# Patient Record
Sex: FEMALE | Race: WHITE | NOT HISPANIC OR LATINO | Employment: OTHER | URBAN - METROPOLITAN AREA
[De-identification: names, ages, dates, MRNs, and addresses within clinical notes are randomized per-mention and may not be internally consistent; named-entity substitution may affect disease eponyms.]

---

## 2017-01-26 ENCOUNTER — TELEPHONE (OUTPATIENT)
Dept: OBSTETRICS AND GYNECOLOGY | Facility: CLINIC | Age: 27
End: 2017-01-26

## 2017-01-26 DIAGNOSIS — N93.0 BLEEDING AFTER INTERCOURSE: Primary | ICD-10-CM

## 2017-01-26 NOTE — TELEPHONE ENCOUNTER
Pt states she just recently started having bleeding after intercourse.  Scheduled US and appt with Dr. Bennett 2/6 @ 1100.  US prep given.

## 2017-02-06 ENCOUNTER — OFFICE VISIT (OUTPATIENT)
Dept: OBSTETRICS AND GYNECOLOGY | Facility: CLINIC | Age: 27
End: 2017-02-06
Payer: COMMERCIAL

## 2017-02-06 ENCOUNTER — PATIENT MESSAGE (OUTPATIENT)
Dept: OBSTETRICS AND GYNECOLOGY | Facility: CLINIC | Age: 27
End: 2017-02-06

## 2017-02-06 VITALS
HEIGHT: 68 IN | WEIGHT: 133.19 LBS | SYSTOLIC BLOOD PRESSURE: 104 MMHG | DIASTOLIC BLOOD PRESSURE: 64 MMHG | BODY MASS INDEX: 20.18 KG/M2

## 2017-02-06 DIAGNOSIS — N93.0 PCB (POST COITAL BLEEDING): Primary | ICD-10-CM

## 2017-02-06 DIAGNOSIS — N76.5 VAGINAL ULCERATION: ICD-10-CM

## 2017-02-06 DIAGNOSIS — N94.10 DYSPAREUNIA IN FEMALE: ICD-10-CM

## 2017-02-06 PROCEDURE — 99999 PR PBB SHADOW E&M-EST. PATIENT-LVL III: CPT | Mod: PBBFAC,,, | Performed by: OBSTETRICS & GYNECOLOGY

## 2017-02-06 PROCEDURE — 87529 HSV DNA AMP PROBE: CPT

## 2017-02-06 PROCEDURE — 99213 OFFICE O/P EST LOW 20 MIN: CPT | Mod: S$GLB,,, | Performed by: OBSTETRICS & GYNECOLOGY

## 2017-02-06 PROCEDURE — 87480 CANDIDA DNA DIR PROBE: CPT

## 2017-02-06 RX ORDER — NORETHINDRONE ACETATE AND ETHINYL ESTRADIOL .02; 1 MG/1; MG/1
1 TABLET ORAL DAILY
Qty: 30 TABLET | Refills: 11 | Status: SHIPPED | OUTPATIENT
Start: 2017-02-06 | End: 2017-05-24

## 2017-02-06 NOTE — PROGRESS NOTES
"Dyspareunia and post-coital spotting. Ulcer noted in vagina adjacent to urethra. HSV swab obtained. Sitz baths recommended.     Chief Complaint:      HPI:      Ainsley Mendez is a 26 y.o.  who presents complaining of continued dyspareunia. States that she has been seeing her boyfriend since 2016. In 2016 she began having continuous vaginal bleeding despite being on Seasonique at the time. Bled x 3 weeks. Tried a higher estrogen dose OCP and then stopped pills all together. Approximately 2 months later was placed back on Seasonique with no further bleeding issues. Has been having a regular monthly period since that time, but feels that she has decreased vaginal lubrication. Was treated for BV in 10/2016 and 2016. States that since 2016 she has noticed pain with intercourse that she feels occurs "just inside of the opening toward the front". Occurs every time and with any position. She reports that over the past few weeks she has begun having minimal post coital spotting after every episode of intercourse. Only notices a few spots, does not persist longer than that.  Ms. Mendez is currently sexually active with a single partner(s). Patient has regular monthly menses. Patient's last menstrual period was 2017 (exact date).      ROS:     GENERAL: Denies fevers or chills. Feeling well overall.   ABDOMEN: Denies abdominal pain, constipation, diarrhea, nausea, vomiting, change in appetite.   URINARY: Denies frequency, dysuria, hematuria.  GYNECOLOGIC: See HPI.  NEUROLOGIC: Denies syncope or weakness.     Physical Exam:      PHYSICAL EXAM:  Visit Vitals    /64    Ht 5' 8" (1.727 m)    Wt 60.4 kg (133 lb 2.5 oz)    LMP 2017 (Exact Date)    BMI 20.25 kg/m2     Body mass index is 20.25 kg/(m^2).     APPEARANCE: Well nourished, well developed, in no acute distress.  ABDOMEN: Soft.  No tenderness or masses.    PELVIC: Normal external genitalia without lesions.  Normal hair " distribution.  Adequate perineal body, normal urethral meatus.  Vagina moist and well rugated without lesions or discharge.  Cervix pink, without lesions, discharge or tenderness.  No significant cystocele or rectocele.  Bimanual exam shows uterus to be normal size, regular, mobile and nontender.  Adnexa without masses or tenderness.  4 mm ulceration just adjacent to the uretha (between the urethra and lateral vagina) on the right. No drainage. Appears like it began as a tear.   EXTREMITIES: No edema.     Results:      Pelvic U/S performed in clinic today: No abnormalities noted.    Assessment/Plan:     PCB (post coital bleeding)  -     Vaginosis Screen by DNA Probe  -     Mycoplasma genitalium Molecular Detection, PCR Cervix; Future; Expected date: 2/6/17  -     Mycoplasma / ureaplasma culture Cervix; Future; Expected date: 2/6/17    Dyspareunia in female  -     Vaginosis Screen by DNA Probe  -     Mycoplasma genitalium Molecular Detection, PCR Cervix; Future; Expected date: 2/6/17  -     Mycoplasma / ureaplasma culture Cervix; Future; Expected date: 2/6/17    Vaginal ulceration  -     Herpes simplex Virus (HSV) Type 1 & 2 DNA by PCR; Future; Expected date: 2/6/17  -     Herpes Simplex Virus 1&2 PCR Non-Blood Genital (vagina)        -     Recommended 10 minute sitz baths with hydrogen peroxide bid x 1 week        -     Recommended abstinence for the next week         -     Lower dose estrogen OCP sent in for patient to increase lubrication per patient request, OTC lubricant recommended

## 2017-02-06 NOTE — MR AVS SNAPSHOT
[facility]  [address]  [place] [address]  Phone: [phone]  Fax: [phone]                  Ainsley Mendez   2017 11:30 AM   Office Visit    Description:  Female : 1990   Provider:  Valarie Bennett MD   Department:  [facility]           Reason for Visit     Follow-up           Diagnoses this Visit        Comments    PCB (post coital bleeding)    -  Primary     Dyspareunia in female         Vaginal ulceration                To Do List           Goals (5 Years of Data)     None      Follow-Up and Disposition     Return if symptoms worsen or fail to improve.    Follow-up and Disposition History       These Medications        Disp Refills Start End    norethindrone-ethinyl estradiol (MICROGESTIN 1/20) 1-20 mg-mcg per tablet 30 tablet 11 2017    Take 1 tablet by mouth once daily. - Oral    Pharmacy: [facility]/pharmacy #[id number] - [place] - [address] [place] [address] #: [phone]         [facility] On Call     [facility] On Call Nurse Care Line -  Assistance  Registered nurses in the [facility] On Call Center provide clinical advisement, health education, appointment booking, and other advisory services.  Call for this free service at [phone].             Medications           Message regarding Medications     Verify the changes and/or additions to your medication regime listed below are the same as discussed with your clinician today.  If any of these changes or additions are incorrect, please notify your healthcare provider.        START taking these NEW medications        Refills    norethindrone-ethinyl estradiol (MICROGESTIN 1/20) 1-20 mg-mcg per tablet 11    Sig: Take 1 tablet by mouth once daily.    Class: Normal    Route: Oral      STOP taking these medications     L norgest/e.estradiol-e.estrad (SEASONIQUE) 0.15 mg-30 mcg (84)/10 mcg (7) 3MPk Take 1 tablet by mouth once daily.           Verify that the below list of  "medications is an accurate representation of the medications you are currently taking.  If none reported, the list may be blank. If incorrect, please contact your healthcare provider. Carry this list with you in case of emergency.           Current Medications     loratadine-pseudoephedrine 5-120 mg (CLARITIN-D 12-HOUR) 5-120 mg per tablet Take 1 tablet by mouth 2 (two) times daily.    norethindrone-ethinyl estradiol (MICROGESTIN 1/20) 1-20 mg-mcg per tablet Take 1 tablet by mouth once daily.    ondansetron (ZOFRAN, AS HYDROCHLORIDE,) 4 MG tablet Take 1 tablet (4 mg total) by mouth daily as needed for Nausea.           Clinical Reference Information           Your Vitals Were     BP Height Weight Last Period BMI    104/64 5' 8" (1.727 m) 60.4 kg (133 lb 2.5 oz) 01/31/2017 (Exact Date) 20.25 kg/m2      Blood Pressure          Most Recent Value    BP  104/64      Allergies as of 2/6/2017     Neomycin      Immunizations Administered on Date of Encounter - 2/6/2017     None      Orders Placed During Today's Visit      Normal Orders This Visit    Herpes Simplex Virus 1&2 PCR Non-Blood Genital (vagina)     Mycoplasma / ureaplasma culture Cervix     Mycoplasma genitalium Molecular Detection, PCR Cervix     Vaginosis Screen by DNA Probe     Future Labs/Procedures Expected by Expires    Herpes simplex Virus (HSV) Type 1 & 2 DNA by PCR  2/6/2017 4/7/2018    Mycoplasma / ureaplasma culture Cervix  2/6/2017 4/7/2018    Mycoplasma genitalium Molecular Detection, PCR Cervix  2/6/2017 4/7/2018      Language Assistance Services     ATTENTION: Language assistance services are available, free of charge. Please call [phone].      ATENCIÓN: Si habla dixon, tiene a ernandez disposición servicios gratuitos de asistencia lingüística. Llame al [phone].     CHÚ Ý: N?u b?n nói Ti?ng Vi?t, có các d?ch v? h? tr? ngôn ng? mi?n phí dành cho b?n. G?i s? [phone].         [facility]'s [facility] complies with applicable Federal " civil rights laws and does not discriminate on the basis of race, color, national origin, age, disability, or sex.

## 2017-02-07 ENCOUNTER — DOCUMENTATION ONLY (OUTPATIENT)
Dept: OBSTETRICS AND GYNECOLOGY | Facility: CLINIC | Age: 27
End: 2017-02-07

## 2017-02-07 LAB
CANDIDA RRNA VAG QL PROBE: NEGATIVE
G VAGINALIS RRNA GENITAL QL PROBE: NEGATIVE
T VAGINALIS RRNA GENITAL QL PROBE: NEGATIVE

## 2017-02-07 NOTE — PROGRESS NOTES
Contacted by lab to say Mycoplasma specimen could not be sent as ordered because vaginal swab reagent had been added to the specimen.   Lab reportedly received only one specimen and ran the affirm (vaginal swab) test on that specimen.   Two specimens were collected, one for affirm and one for Mycoplasma, and to the best of my knowledge, two specimens were sent to lab.

## 2017-02-08 ENCOUNTER — OFFICE VISIT (OUTPATIENT)
Dept: OBSTETRICS AND GYNECOLOGY | Facility: CLINIC | Age: 27
End: 2017-02-08
Payer: COMMERCIAL

## 2017-02-08 DIAGNOSIS — N76.0 ACUTE VAGINITIS: Primary | ICD-10-CM

## 2017-02-08 DIAGNOSIS — N76.5 VAGINAL ULCERATION: ICD-10-CM

## 2017-02-08 LAB
HSV PCR SPECIMEN SOURCE: NORMAL
HSV1 PCR RESULT: NOT DETECTED
HSV2 PCR RESULT: NOT DETECTED

## 2017-02-08 PROCEDURE — 99499 UNLISTED E&M SERVICE: CPT | Mod: S$GLB,,, | Performed by: OBSTETRICS & GYNECOLOGY

## 2017-02-08 PROCEDURE — 87798 DETECT AGENT NOS DNA AMP: CPT

## 2017-02-14 ENCOUNTER — PATIENT MESSAGE (OUTPATIENT)
Dept: OBSTETRICS AND GYNECOLOGY | Facility: CLINIC | Age: 27
End: 2017-02-14

## 2017-02-14 LAB
SPECIMEN SOURCE: ABNORMAL
U PARVUM DNA SPEC QL NAA+PROBE: POSITIVE
U UREALYTICUM DNA SPEC QL NAA+PROBE: NEGATIVE

## 2017-02-14 RX ORDER — DOXYCYCLINE HYCLATE 100 MG
100 TABLET ORAL 2 TIMES DAILY
Qty: 28 TABLET | Refills: 0 | Status: SHIPPED | OUTPATIENT
Start: 2017-02-14 | End: 2017-02-28

## 2017-03-27 ENCOUNTER — PATIENT MESSAGE (OUTPATIENT)
Dept: OBSTETRICS AND GYNECOLOGY | Facility: CLINIC | Age: 27
End: 2017-03-27

## 2017-03-29 ENCOUNTER — OFFICE VISIT (OUTPATIENT)
Dept: OBSTETRICS AND GYNECOLOGY | Facility: CLINIC | Age: 27
End: 2017-03-29
Payer: COMMERCIAL

## 2017-03-29 VITALS
DIASTOLIC BLOOD PRESSURE: 76 MMHG | WEIGHT: 133.06 LBS | SYSTOLIC BLOOD PRESSURE: 114 MMHG | HEIGHT: 68 IN | BODY MASS INDEX: 20.16 KG/M2

## 2017-03-29 DIAGNOSIS — N76.1 CHRONIC VAGINITIS: Primary | ICD-10-CM

## 2017-03-29 DIAGNOSIS — N93.0 PCB (POST COITAL BLEEDING): ICD-10-CM

## 2017-03-29 DIAGNOSIS — N76.5 VAGINAL ULCERATION: ICD-10-CM

## 2017-03-29 PROCEDURE — 99213 OFFICE O/P EST LOW 20 MIN: CPT | Mod: S$GLB,,, | Performed by: OBSTETRICS & GYNECOLOGY

## 2017-03-29 PROCEDURE — 99999 PR PBB SHADOW E&M-EST. PATIENT-LVL III: CPT | Mod: PBBFAC,,, | Performed by: OBSTETRICS & GYNECOLOGY

## 2017-03-29 PROCEDURE — 87798 DETECT AGENT NOS DNA AMP: CPT | Mod: 91

## 2017-03-29 PROCEDURE — 1160F RVW MEDS BY RX/DR IN RCRD: CPT | Mod: S$GLB,,, | Performed by: OBSTETRICS & GYNECOLOGY

## 2017-03-29 PROCEDURE — 87480 CANDIDA DNA DIR PROBE: CPT

## 2017-03-29 PROCEDURE — 87798 DETECT AGENT NOS DNA AMP: CPT

## 2017-03-29 PROCEDURE — 87529 HSV DNA AMP PROBE: CPT

## 2017-03-29 NOTE — MR AVS SNAPSHOT
"    [facility]  [address]  [place] [address]  Phone: [phone]  Fax: [phone]                  Ainsley Mendez   3/29/2017 3:30 PM   Office Visit    Description:  Female : 1990   Provider:  Valarie Bennett MD   Department:  [facility]           Reason for Visit     Follow-up                To Do List           Future Appointments        Provider Department Dept Phone    2017 8:30 AM Valarie Bennett MD [facility] [phone]      Goals (5 Years of Data)     None      [facility] On Call     [facility] On Call Nurse [facility] Line -  Assistance  Registered nurses in the [facility] On Call Center provide clinical advisement, health education, appointment booking, and other advisory services.  Call for this free service at [phone].             Medications           Message regarding Medications     Verify the changes and/or additions to your medication regime listed below are the same as discussed with your clinician today.  If any of these changes or additions are incorrect, please notify your healthcare provider.             Verify that the below list of medications is an accurate representation of the medications you are currently taking.  If none reported, the list may be blank. If incorrect, please contact your healthcare provider. Carry this list with you in case of emergency.           Current Medications     loratadine-pseudoephedrine 5-120 mg (CLARITIN-D 12-HOUR) 5-120 mg per tablet Take 1 tablet by mouth 2 (two) times daily.    norethindrone-ethinyl estradiol (MICROGESTIN ) 1-20 mg-mcg per tablet Take 1 tablet by mouth once daily.    ondansetron (ZOFRAN, AS HYDROCHLORIDE,) 4 MG tablet Take 1 tablet (4 mg total) by mouth daily as needed for Nausea.           Clinical Reference Information           Your Vitals Were     BP Height Weight Last Period BMI    114/76 5' 8" (1.727 m) 60.3 kg (133 lb 0.8 oz) " 03/03/2017 (Approximate) 20.23 kg/m2      Blood Pressure          Most Recent Value    BP  114/76      Allergies as of 3/29/2017     Neomycin      Immunizations Administered on Date of Encounter - 3/29/2017     None      Language Assistance Services     ATTENTION: Language assistance services are available, free of charge. Please call [phone].      ATENCIÓN: Si habla bryanañol, tiene a ernandez disposición servicios gratuitos de asistencia lingüística. Llame al [phone].     CHÚ Ý: N?u b?n nói Ti?ng Vi?t, có các d?ch v? h? tr? ngôn ng? mi?n phí dành cho b?n. G?i s? [phone].         [facility]'s [facility] complies with applicable Federal civil rights laws and does not discriminate on the basis of race, color, national origin, age, disability, or sex.

## 2017-03-31 ENCOUNTER — PATIENT MESSAGE (OUTPATIENT)
Dept: OBSTETRICS AND GYNECOLOGY | Facility: CLINIC | Age: 27
End: 2017-03-31

## 2017-03-31 LAB
HSV PCR SPECIMEN SOURCE: NORMAL
HSV1 PCR RESULT: NOT DETECTED
HSV2 PCR RESULT: NOT DETECTED

## 2017-03-31 NOTE — PROGRESS NOTES
"  Chief Complaint: Post-Coital Bleeding, Dyspareunia     HPI:      Ainsley Mendez is a 26 y.o.  who presents complaining of post-coital spotting and dyspareunia. Patient has recently been treated for a few recurrent bouts of vaginitis, including a 2 week course for both her and her partner for ureaplasma infection. She currently denies discharge or odor, and only has pain during intercourse. Pain occurs almost immediately and lasts throughout intercourse. Afterwards notices spotting on the toilet paper with her first void, but not thereafter.  Ms. Mendez is currently sexually active with a single partner(s). Patient has regular monthly menses. Patient's last menstrual period was 2017 (approximate).      ROS:     GENERAL: Denies unintentional weight gain or weight loss. Feeling well overall.   ABDOMEN: Denies abdominal pain, constipation, diarrhea, nausea, vomiting, change in appetite.   URINARY: Denies frequency, dysuria, hematuria.  GYNECOLOGIC: See HPI.  NEUROLOGIC: Denies syncope or weakness.     Physical Exam:      PHYSICAL EXAM:  /76  Ht 5' 8" (1.727 m)  Wt 60.3 kg (133 lb 0.8 oz)  LMP 2017 (Approximate)  BMI 20.23 kg/m2  Body mass index is 20.23 kg/(m^2).     APPEARANCE: Well nourished, well developed, in no acute distress.  ABDOMEN: Soft.  No tenderness or masses.    PELVIC: Normal external genitalia without lesions.  Normal hair distribution.  Adequate perineal body, normal urethral meatus.  Vagina moist and well rugated without discharge. Areas of ulceration bilaterally adjacent to the urethral meatus which were evident only when the vaginal wall was pulled away. Ulcerations are irregular, with no raised borders. Each about 3 mm in greatest diameter. Both are a darker shade of pink than the surrounding tissue, as if the most superficial layer has been rubbed off. No evidence of infection. Do not appear c/w HSV. Cervix pink, without lesions, discharge or tenderness.  No " significant cystocele or rectocele.  Bimanual exam shows uterus to be normal size, regular, mobile and nontender.  Adnexa without masses or tenderness.  No pain noted on careful exam other than in bilateral areas of ulceration.    EXTREMITIES: No edema.       Assessment/Plan:     Chronic vaginitis  -     Vaginosis Screen by DNA Probe  -     Cancel: Herpes simplex virus culture [facility]; Vagina  -     Mycoplasma genitalium Molecular Detection, PCR Cervix; Future; Expected date: 3/29/17  -     Mycoplasma / ureaplasma culture Cervix; Future; Expected date: 3/29/17    PCB (post coital bleeding)    Vaginal ulceration  -     Herpes Simplex Virus 1&2 PCR Non-Blood        -     Patient counseled that she needs pelvic rest until areas of ulceration have healed.        -     Will RTC in 3 weeks for assessment of healing.

## 2017-04-04 LAB
MYCOPLASMA GENITALIUM RESULT: NEGATIVE
SPECIMEN SOURCE: NORMAL
SPECIMEN SOURCE: NORMAL
U PARVUM DNA SPEC QL NAA+PROBE: NEGATIVE
U UREALYTICUM DNA SPEC QL NAA+PROBE: NEGATIVE

## 2017-04-07 ENCOUNTER — PATIENT MESSAGE (OUTPATIENT)
Dept: OBSTETRICS AND GYNECOLOGY | Facility: CLINIC | Age: 27
End: 2017-04-07

## 2017-04-19 ENCOUNTER — OFFICE VISIT (OUTPATIENT)
Dept: OBSTETRICS AND GYNECOLOGY | Facility: CLINIC | Age: 27
End: 2017-04-19
Payer: COMMERCIAL

## 2017-04-19 VITALS
WEIGHT: 134.94 LBS | SYSTOLIC BLOOD PRESSURE: 106 MMHG | BODY MASS INDEX: 20.45 KG/M2 | DIASTOLIC BLOOD PRESSURE: 70 MMHG | HEIGHT: 68 IN

## 2017-04-19 DIAGNOSIS — N76.5 VAGINAL ULCERATION: Primary | ICD-10-CM

## 2017-04-19 PROCEDURE — 1160F RVW MEDS BY RX/DR IN RCRD: CPT | Mod: S$GLB,,, | Performed by: OBSTETRICS & GYNECOLOGY

## 2017-04-19 PROCEDURE — 99212 OFFICE O/P EST SF 10 MIN: CPT | Mod: S$GLB,,, | Performed by: OBSTETRICS & GYNECOLOGY

## 2017-04-19 PROCEDURE — 99999 PR PBB SHADOW E&M-EST. PATIENT-LVL II: CPT | Mod: PBBFAC,,, | Performed by: OBSTETRICS & GYNECOLOGY

## 2017-04-19 NOTE — MR AVS SNAPSHOT
[facility] [address]  [place] [address]  Phone: [phone]  Fax: [phone]                  Ainsley Mendez   2017 8:30 AM   Office Visit    Description:  Female : 1990   Provider:  Valarie Bennett MD   Department:  [facility]           Reason for Visit     Follow-up           Diagnoses this Visit        Comments    Vaginal ulceration    -  Primary            To Do List           Goals (5 Years of Data)     None      Follow-Up and Disposition     Return if symptoms worsen or fail to improve.    Follow-up and Disposition History      [facility] On Call     [facility] On Call Nurse Care Line -  Assistance  Unless otherwise directed by your provider, please contact [facility], our nurse care line that is available for  assistance.     Registered nurses in the [facility] On Call Center provide: appointment scheduling, clinical advisement, health education, and other advisory services.  Call: [phone] (toll free)               Medications           Message regarding Medications     Verify the changes and/or additions to your medication regime listed below are the same as discussed with your clinician today.  If any of these changes or additions are incorrect, please notify your healthcare provider.             Verify that the below list of medications is an accurate representation of the medications you are currently taking.  If none reported, the list may be blank. If incorrect, please contact your healthcare provider. Carry this list with you in case of emergency.           Current Medications     loratadine-pseudoephedrine 5-120 mg (CLARITIN-D 12-HOUR) 5-120 mg per tablet Take 1 tablet by mouth 2 (two) times daily.    norethindrone-ethinyl estradiol (MICROGESTIN /20) 1-20 mg-mcg per tablet Take 1 tablet by mouth once daily.    ondansetron (ZOFRAN, AS HYDROCHLORIDE,) 4 MG tablet Take 1 tablet (4 mg total) by mouth daily as  "needed for Nausea.           Clinical Reference Information           Your Vitals Were     BP Height Weight Last Period BMI    106/70 5' 8" (1.727 m) 61.2 kg (134 lb 14.7 oz) 04/01/2017 (Approximate) 20.51 kg/m2      Blood Pressure          Most Recent Value    BP  106/70      Allergies as of 4/19/2017     Neomycin      Immunizations Administered on Date of Encounter - 4/19/2017     None      Language Assistance Services     ATTENTION: Language assistance services are available, free of charge. Please call [phone].      ATENCIÓN: Si habla español, tiene a ernandez disposición servicios gratuitos de asistencia lingüística. Llame al [phone].     PAM Ý: N?u b?n nói Ti?ng Vi?t, có các d?ch v? h? tr? ngôn ng? mi?n phí dành cho b?n. G?i s? [phone].         [facility] complies with applicable Federal civil rights laws and does not discriminate on the basis of race, color, national origin, age, disability, or sex.        "

## 2017-04-19 NOTE — PROGRESS NOTES
"  Chief Complaint: f/u vaginal ulceration     HPI:      Ainsley Mendez is a 27 y.o.  who presents for follow up of vaginal ulcerations. Patient has a several month h/o recurrent vaginitis. At last visit was noted to have ulcerations adjacent to the urethral meatus bilaterally. For the past 2 weeks she has been taking Sitz baths and undergoing pelvic rest. Denies abnormal discharge or vaginal/pelvic pain at this time. Patient's last menstrual period was 2017 (approximate).      ROS:     GENERAL: Denies fevers, chills. Feeling well overall.   ABDOMEN: Denies abdominal pain, constipation, diarrhea, nausea, vomiting, change in appetite.   URINARY: Denies frequency, dysuria, hematuria.  GYNECOLOGIC: Denies abnormal bleeding or discharge.    Physical Exam:      PHYSICAL EXAM:  /70  Ht 5' 8" (1.727 m)  Wt 61.2 kg (134 lb 14.7 oz)  LMP 2017 (Approximate)  BMI 20.51 kg/m2  Body mass index is 20.51 kg/(m^2).     APPEARANCE: Well nourished, well developed, in no acute distress.  ABDOMEN: Soft.  No tenderness or masses.    PELVIC: Normal external genitalia without lesions.  Normal hair distribution.  Adequate perineal body, normal urethral meatus.  Left side of urethral meatus healed, no areas of tenderness or denuded tissue. To the right of the urethral meatus there is still a 2 mm ulceration which is decreased in height by about half. Area remains tender.   EXTREMITIES: No edema.     Results:       Office Visit on 2017   Component Date Value Ref Range Status    Trichomonas vaginalis 2017 Negative  Negative Final    Gardnerella vaginalis 2017 Negative  Negative Final    Candida sp 2017 Negative  Negative Final    Mycoplasma Genitalium Specimen Bharti* 2017 CERVIX/ENDOCERVIX   Corrected    Mycoplasma Genitalium Result 2017 Negative  Not Applicable Final        Ureaplasma, PCR Source 2017 CERVIX/ENDOCERVIX   Final    Ureaplasma urealyticum PCR " 03/29/2017 Negative  Not Applicable Final    Ureaplasma parvum PCR 03/29/2017 Negative  Not Applicable Final        HSV PCR Specimen Source 03/29/2017 See Below   Corrected    RESULT: Urogenital - Vagina    HSV1 PCR Result 03/29/2017 Not detected  Not detected Final    HSV2 PCR Result 03/29/2017 Not detected  Not detected Final           Assessment/Plan:     Vaginal Ulceration  - Patient counseled to continue Sitz baths and pelvic rest x 1 more week.   - Advised that when she resumes sexual activity, if she continues to have pain in the same spot she should resume Sitz baths and pelvic rest.

## 2017-05-01 ENCOUNTER — PATIENT MESSAGE (OUTPATIENT)
Dept: OBSTETRICS AND GYNECOLOGY | Facility: CLINIC | Age: 27
End: 2017-05-01

## 2017-05-02 ENCOUNTER — OFFICE VISIT (OUTPATIENT)
Dept: OBSTETRICS AND GYNECOLOGY | Facility: CLINIC | Age: 27
End: 2017-05-02
Payer: COMMERCIAL

## 2017-05-02 VITALS — BODY MASS INDEX: 19.59 KG/M2 | WEIGHT: 132.25 LBS | HEIGHT: 69 IN

## 2017-05-02 DIAGNOSIS — N76.0 CONTACT VAGINITIS: Primary | ICD-10-CM

## 2017-05-02 DIAGNOSIS — N94.10 DYSPAREUNIA IN FEMALE: ICD-10-CM

## 2017-05-02 DIAGNOSIS — N90.89 VULVAR IRRITATION: ICD-10-CM

## 2017-05-02 PROCEDURE — 99999 PR PBB SHADOW E&M-EST. PATIENT-LVL II: CPT | Mod: PBBFAC,,, | Performed by: OBSTETRICS & GYNECOLOGY

## 2017-05-02 PROCEDURE — 1160F RVW MEDS BY RX/DR IN RCRD: CPT | Mod: S$GLB,,, | Performed by: OBSTETRICS & GYNECOLOGY

## 2017-05-02 PROCEDURE — 99214 OFFICE O/P EST MOD 30 MIN: CPT | Mod: S$GLB,,, | Performed by: OBSTETRICS & GYNECOLOGY

## 2017-05-02 RX ORDER — BETAMETHASONE VALERATE 1.2 MG/G
OINTMENT TOPICAL NIGHTLY
Qty: 15 G | Refills: 2 | Status: SHIPPED | OUTPATIENT
Start: 2017-05-02 | End: 2020-04-24

## 2017-05-02 NOTE — PROGRESS NOTES
HISTORY OF PRESENT ILLNESS:    Ainsley Mendez is a 27 y.o. female, , Patient's last menstrual period was 2017.,  presents for a problem visit, complaining of a new problem from prior exams with me - BTB ON OCP AND PELVIC PAIN HAVE RESOLVED.  HAS BEEN SEEING GYN IN [place] FOR THE PAST 6 MONTHS WITH DYSPAREUNIA, POSTCOITAL SPOTTING, AND PAINFUL VULVAR ULCERATIONS.  RECENT NEGATIVE TESTING FOR BV AND YEAST, AND NOT SUSPICIOUS OF STI.  ADVISED MORE LIKELY TOPICAL ALLERGEN AND PT COUNSELED RE POTENTIAL CONTACTS, INCLUDING SCENTED SOAP, LAUNDRY DETERGERENT, FABRIC SOFTENERS, ETC.  PATIENT WILL REVIEW LIST, ATTACHED BELOW FOR OTHER POSS EXPOSURES.  VALISONE FOR SYMPTOMATIC RELIEF FOR NOW AND DISCUSSED LOCAL CARE.  SEEN RECENTLY BY GYN FOR PCB, DYSPAREUNIA AND PERIURETHRAL ULCARATIONS  2017:  Findings: The uterus measures 5.2 cm x 3.4 cm x 3.7 cm.The uterus is anteflexed. No uterine masses. The endometrium measures approximately 4 mm. The right ovary measures 1.5 cm x 1.6 cm x 3.4 cm.  A paratubal cyst is identified and measures 1.6 cm in maximum diameter  .  The left ovary measures 2.6 cm x 2.8 cm x 1.4 cm.  Normal ovarian arterial and venous flow.  No adnexal abnormalities identified.   Impression    Normal exam         LAST VISIT WITH ME 2016:  CONTINUED ABNORMAL BLEEDING.  PATIENT PHONED ME WITH CONTINUED BLEEDING ISSUES. UPON FURTHER QUESTIONING, WAS DOUBLING UP ON SEASONIQUE AND HAD MILD DECREASE IN BLEEDING -, THEN BEGAN LOESTRIN 1. WITHOUT ALLOWING A WITHDRAWAL BLEED. BLEEDING RESUMED AND WAS HEAVY, BLEEDING THROUGH DOUBLE PROTECTION (SUPER TAMPON AND PANTYLINER).   WILL CHECK CBC NOW, PLUS TSH, PRL, BHCG  AND NEEDS TO ALLOW WITHDRAWAL PRIOR TO BEGINNING NEW PILL. SHE MAY BE EVENTUALLY ABLE TO WITHDRAW EVERY 2-3 MONTHS, BUT NEEDS TO EXTABLISH A MORE REGULAR CYCLE AT THIS POINT. HAS CONSTIPATION WITH FE, ADVISED RE FERROSEQUELS   PELVIC USG WAS NORMAL  LAST SEEN  :  Ainsley Mendez is a 26 y.o. female, , Patient's last menstrual period was 2016., presents for a PROBLEM VISIT - TAKES SEASONIQUE, LMP APPROX 3 WEEKS AGO - HAS HAD BTB AND CRAMPING ANTONY OVER THE PAST DAYS. BLEEDING IS INTERMITENTLY HEAVY AND ALSO HAS PAIN, BEGAN ACUTELY 4 DAYS AGO, R=L. PAIN IS SIMILAR TO PAIN SHE HAD IN PAST WITH OV CYST AND WANTS EVALUATED.    HAD A SIMILAR PROBLEM WITH PAIN AND IRREG BLEEDING LAST MONTH REF PELVIC USG NOW  HAS HX ABNL PAP IN [place] 2 YRS AGO AN HAS BEEN GETTING PAP SMEARS EVERY 6 MONTHS, DUE IN AUGUST - WILL REQUEST RECORDS AND F/U WITH NEXT PAP, REC FOLLOW UP PER MOST RECENT GUIDELINES BASED ON HER RECORDS, RESULTS  MENSES HAVE ALWAYS BEEN HEAVY, CRAMPY AND ADVISED RE LOESTRIN 1. - MAY TRY TO TAKE CONTINUOUSLY IF NO SIGNIF BTB  ORIG FROM [place], WORKS , NIECE TO HERNESTO LUDWIG . .       Past Medical History:   Diagnosis Date    Abnormal Pap smear of cervix     Hpv +        (Ascus/ Hpv neg on 8-10-16)    History of HPV infection     Pap smear for cervical cancer screening 08/10/2016    ASCUS/ Hpv Negative  (Previous Hpv + Paps, Repeat paps Q 6 months) (DUE REPEAT PAP 2017)       Past Surgical History:   Procedure Laterality Date    COLPOSCOPY      TONSILLECTOMY         MEDICATIONS AND ALLERGIES:      Current Outpatient Prescriptions:     loratadine-pseudoephedrine 5-120 mg (CLARITIN-D 12-HOUR) 5-120 mg per tablet, Take 1 tablet by mouth 2 (two) times daily., Disp: , Rfl:     norethindrone-ethinyl estradiol (MICROGESTIN ) 1-20 mg-mcg per tablet, Take 1 tablet by mouth once daily., Disp: 30 tablet, Rfl: 11    ondansetron (ZOFRAN, AS HYDROCHLORIDE,) 4 MG tablet, Take 1 tablet (4 mg total) by mouth daily as needed for Nausea., Disp: 10 tablet, Rfl: 1    betamethasone valerate 0.1% (VALISONE) 0.1 % Oint, Apply topically every evening., Disp: 15 g, Rfl: 2    Review of patient's allergies indicates:   Allergen Reactions     "Neomycin Rash       Family History   Problem Relation Age of Onset    Breast cancer Paternal Aunt     Prostate cancer Maternal Grandfather     Ovarian cancer Neg Hx     Colon cancer Neg Hx        Social History     Social History    Marital status: Single     Spouse name: N/A    Number of children: N/A    Years of education: N/A     Occupational History    Not on file.     Social History Main Topics    Smoking status: Never Smoker    Smokeless tobacco: Not on file    Alcohol use Yes      Comment: Socially    Drug use: No    Sexual activity: Yes     Partners: Male     Birth control/ protection: OCP      Comment: Single:  In a relationship     Other Topics Concern    Not on file     Social History Narrative       COMPREHENSIVE GYN HISTORY:  PAP History: Denies abnormal Paps.  Infection History: Denies STDs. Denies PID.  Benign History: Denies uterine fibroids. Denies ovarian cysts. Denies endometriosis. Denies other conditions.  Cancer History: Denies cervical cancer. Denies uterine cancer or hyperplasia. Denies ovarian cancer. Denies vulvar cancer or pre-cancer. Denies vaginal cancer or pre-cancer. Denies breast cancer. Denies colon cancer.    ROS:  GENERAL: No weight changes. No swelling. No fatigue. No fever.  CARDIOVASCULAR: No chest pain. No shortness of breath. No leg cramps.   NEUROLOGICAL: No headaches. No vision changes.  BREASTS: No pain. No lumps. No discharge.  ABDOMEN: No pain. No nausea. No vomiting. No diarrhea. No constipation.  REPRODUCTIVE: No abnormal bleeding.   VULVA: No pain. No lesions. No itching.  VAGINA: No relaxation. No itching. No odor. No discharge. No lesions.  URINARY: No incontinence. No nocturia. No frequency. No dysuria.    Ht 5' 9" (1.753 m)  Wt 60 kg (132 lb 4.4 oz)  LMP 04/20/2017  BMI 19.53 kg/m2    PE:  APPEARANCE: Well nourished, well developed, in no acute distress.  ABDOMEN: Soft. No tenderness or masses. No hepatosplenomegaly. No hernias.  BREASTS, " FUNDOSCOPIC, RECTAL DEFERRED  PELVIC: External female genitalia with TWO AREAS OF ERYTHEMA WITHIN VESTIBULE ON THE RIGHT AND THE LEFT, MILDLY TENDER TO TOUCH, NO EVIDENT ULCERATIONS.  Female hair distribution. Adequate perineal body, Normal urethral meatus. Vagina moist and well rugated without lesions or discharge.  No significant cystocele or rectocele present. Cervix pink without lesions, discharge or tenderness. Uterus is normal size, regular, mobile and nontender. Adnexa without masses or tenderness.  EXTREMITIES: No edema    PROCEDURES:    DIAGNOSIS:  1. Contact vaginitis     2. Dyspareunia in female     3. Vulvar irritation         LABS AND TESTS ORDERED:    MEDICATIONS PRESCRIBED:VALISONE    COUNSELING:   Self-Help Tips for Vulvar Skin Care    Prevention of recurrent vulvar and vaginal irritation often begins with the use of hypoallergenic products, plus avoidance of exposures that irritate sensitive skin.    Clothing and Laundry  ? Wear all-white cotton underwear.   ? Do not wear pantyhose (wear thigh high or knee high hose instead).   ? Wear loose-fitting pants or skirts.   ? Remove wet bathing suits and exercise clothing promptly.   ? Use hypoallergenic, dermatologically approved detergent such as Tide Free, or All Free and Clear.  ? Double-rinse underwear and any other clothing that comes into contact with the vulva.   ? Do not use fabric softener on undergarments.  Hygiene  ? Use soft, white, unscented toilet paper.   ? Use lukewarm or cool sitz baths to relieve burning and irritation.   ? Avoid getting shampoo on the vulvar area.   ? Do not use bubble bath, feminine hygiene products, or any perfumed creams or soaps.   ? Wash the vulva with cool to lukewarm water only.  ? Rinse the vulva with water after urination.  ? Urinate before the bladder is full.   ? Prevent constipation by adding fiber to your diet (if necessary, use a psyllium product such as Metamucil) and drinking at least 8 glasses of water  daily.  ? Use unscented menstrual pads and tampons. Do not wear panty liners daily.  Sexual intercourse  ? Use a lubricant that is water soluble, e.g., Astroglide or KY and unscented.  ? Ask your physician for a prescription for a topical anesthestic, e.g., Lidocaine gel 5%. (This may sting for the first 3-5 minutes after application.)  ? Apply ice or a frozen blue gel pack (lunch box size) wrapped in one layer of a hand towel to relieve burning after intercourse.   ? Urinate (to prevent infection) and rinse vulva with cool water after sexual intercourse.  ? Do not use contraceptive creams or spermicides.  Physical Activities  ? Avoid exercises that put direct pressure on the vulva such as bicycle riding and horseback riding.  ? Limit intense exercises that create a lot of friction in the vulvar area (try lower intensity exercises such as walking).  ? Use a frozen gel pack wrapped in a towel to relieve symptoms after exercise.   ? Enroll in an exercise class such as yoga to learn stretching and relaxation exercises.  ? Don't swim in highly chlorinated pools.   ? Avoid the use of hot tubs.    FOLLOW-UP with me routine visit.

## 2017-05-23 ENCOUNTER — PATIENT MESSAGE (OUTPATIENT)
Dept: OBSTETRICS AND GYNECOLOGY | Facility: CLINIC | Age: 27
End: 2017-05-23

## 2017-05-24 ENCOUNTER — PATIENT MESSAGE (OUTPATIENT)
Dept: OBSTETRICS AND GYNECOLOGY | Facility: CLINIC | Age: 27
End: 2017-05-24

## 2017-05-24 RX ORDER — LEVONORGESTREL AND ETHINYL ESTRADIOL 0.1-0.02MG
1 KIT ORAL DAILY
Qty: 30 TABLET | Refills: 11 | Status: SHIPPED | OUTPATIENT
Start: 2017-05-24 | End: 2017-07-14 | Stop reason: ALTCHOICE

## 2017-06-01 ENCOUNTER — PATIENT MESSAGE (OUTPATIENT)
Dept: OBSTETRICS AND GYNECOLOGY | Facility: CLINIC | Age: 27
End: 2017-06-01

## 2017-06-26 ENCOUNTER — PATIENT MESSAGE (OUTPATIENT)
Dept: OBSTETRICS AND GYNECOLOGY | Facility: CLINIC | Age: 27
End: 2017-06-26

## 2017-06-27 ENCOUNTER — PATIENT MESSAGE (OUTPATIENT)
Dept: OBSTETRICS AND GYNECOLOGY | Facility: CLINIC | Age: 27
End: 2017-06-27

## 2017-06-27 RX ORDER — ESTRADIOL 0.1 MG/G
1 CREAM VAGINAL DAILY
Qty: 42.5 G | Refills: 1 | Status: SHIPPED | OUTPATIENT
Start: 2017-06-27 | End: 2017-07-14 | Stop reason: ALTCHOICE

## 2017-07-13 ENCOUNTER — PATIENT MESSAGE (OUTPATIENT)
Dept: OBSTETRICS AND GYNECOLOGY | Facility: CLINIC | Age: 27
End: 2017-07-13

## 2017-07-14 RX ORDER — LEVONORGESTREL / ETHINYL ESTRADIOL AND ETHINYL ESTRADIOL 150-30(84)
1 KIT ORAL DAILY
Qty: 84 EACH | Refills: 3 | Status: SHIPPED | OUTPATIENT
Start: 2017-07-14 | End: 2018-08-13

## 2018-01-12 ENCOUNTER — TELEPHONE (OUTPATIENT)
Dept: OBSTETRICS AND GYNECOLOGY | Facility: CLINIC | Age: 28
End: 2018-01-12

## 2018-01-12 NOTE — TELEPHONE ENCOUNTER
Pt states she wants a 30 day hormone panel.  Her OBGYN in [place] does not do it and no one can figure out what is wrong with her.  She has been having problems for the past 1.5 years and thinks she has it figured out but wouldn't elaborate on anything.  Informed her that Dr. Bennett may not do a 30 day hormone planel but she can come in to discuss concerns.

## 2018-01-16 ENCOUNTER — OFFICE VISIT (OUTPATIENT)
Dept: OBSTETRICS AND GYNECOLOGY | Facility: CLINIC | Age: 28
End: 2018-01-16
Payer: COMMERCIAL

## 2018-01-16 ENCOUNTER — TELEPHONE (OUTPATIENT)
Dept: OBSTETRICS AND GYNECOLOGY | Facility: CLINIC | Age: 28
End: 2018-01-16

## 2018-01-16 VITALS
WEIGHT: 133.25 LBS | SYSTOLIC BLOOD PRESSURE: 116 MMHG | DIASTOLIC BLOOD PRESSURE: 78 MMHG | BODY MASS INDEX: 19.74 KG/M2 | HEIGHT: 69 IN

## 2018-01-16 DIAGNOSIS — N90.89 VULVAR IRRITATION: Primary | ICD-10-CM

## 2018-01-16 DIAGNOSIS — N94.10 DYSPAREUNIA IN FEMALE: ICD-10-CM

## 2018-01-16 PROCEDURE — 99212 OFFICE O/P EST SF 10 MIN: CPT | Mod: S$GLB,,, | Performed by: OBSTETRICS & GYNECOLOGY

## 2018-01-16 PROCEDURE — 99999 PR PBB SHADOW E&M-EST. PATIENT-LVL III: CPT | Mod: PBBFAC,,, | Performed by: OBSTETRICS & GYNECOLOGY

## 2018-01-16 NOTE — PROGRESS NOTES
"  Chief Complaint: Recurrent Vulvar Ulceration     HPI:      Ainsley Mendez is a 27 y.o. G0 who presents complaining of recurrent vulvar irritation and ulceration for what the patient states has been the past 1.5 years. The first documentation I can find for this problem is from 10/2016. Patient has seen myself, Dr. Melendez, and an MD in her hometown in [place] for this issue.     10/2016 - Patient reported pain with deep penetration, felt that boyfriend was hitting a "tender spot" during intercourse. At that time she was on LoEstrin. Testing was negative for GC/CT, positive for gardnerella. Pt tx'd with 1 week course of Flagyl. Symptoms resolved entirely for a few weeks.     12/2016 - Symptoms returned (discomfort, pruritis, pain with intercourse), tx'd with a second round of Flagyl as that had improved symptoms previously. Patient switched back to Seasonique.    2/2017 - Dyspareunia continued, now with post-coital spotting. Ulcer in vagina adjacent to urethra noted on exam. HSV swab negative. Pt counseled on sitz baths, switched to Microgestin per patient request for lower estrogen dosage in OCP. Mycoplasma and Ureaplasma swabs collected, ureaplasma positive, patient and partner treated with 2 week course of Doxy + Flagyl.    3/2017 - continued dyspareunia and post-coital spotting. Ulcerations on either side of urethral meatus. HSV swabs repeated, negative. Continued sitz baths with pelvic rest.     4/2017 - Right sided ulceration remained, patient counseled to continue sitz baths and pelvic rest until resolved.    5/2017 - Patient started on Valisone0.1% by Dr. Melendez; Patient complained to me about increasing moodiness on OCPs and asked for a new pill type. Switched to Alesse. Offered topical estrogen to expedite ulcer healing.    6/2017 - Started on topical estradiol.     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 Pt treated with Acyclovir with no change in " "symptoms.     At present: Ms. Mendez is currently sexually active with a single male partner. She has been with the partner since prior to symptoms beginning. Patient had restarted Seasonique for contraception but discontinued several weeks ago as she began to feel that this was causing her to have enlarged leg veins and tachycardia. Is currently being evaluated by cardiology, and has an appointment tomorrow. Has had Echo and Holter studies done. She continues to have small, non-healing ulcers within her vaginal introitus causing dyspareunia and post-coital bleeding. Reports that MD in [place] biopsied one of these sites and told her that "everything was normal". Patient is frustrated with her continued pain during intercourse, especially as she is recently engaged and "needs everything to be normal before" getting .  Patient's last menstrual period was 01/03/2018 (exact date).      ROS:     GENERAL: Denies fevers or chills. Feeling well overall.   ABDOMEN: Denies abdominal pain, constipation, diarrhea, nausea, vomiting, change in appetite.   URINARY: Denies frequency, dysuria, hematuria.  NEUROLOGIC: Denies syncope or weakness.     Physical Exam:      PHYSICAL EXAM:  /78   Ht 5' 9" (1.753 m)   Wt 60.5 kg (133 lb 4.3 oz)   LMP 01/03/2018 (Exact Date)   BMI 19.68 kg/m²   Body mass index is 19.68 kg/m².     APPEARANCE: Well nourished, well developed, in no acute distress.  PSYCH: Appropriate mood and affect.  EXTREMITIES: No edema.     Assessment/Plan:     Vulvar irritation  Dyspareunia in female  Vulvar ulcerations      -    Referral placed to Dr. Kristian Palacios III.       -    Patient information sent to Dr. Palacios's nurse.      -    Patient instructed to obtain medical records from [place] so that Dr. Palacios can have a complete picture of patient's previous work up.    "

## 2018-01-16 NOTE — TELEPHONE ENCOUNTER
----- Message from Chris Robbins MA sent at 1/16/2018  9:55 AM CST -----  Pt is having recurrent vulvar ulcerations non HSV.

## 2018-01-22 ENCOUNTER — TELEPHONE (OUTPATIENT)
Dept: OBSTETRICS AND GYNECOLOGY | Facility: CLINIC | Age: 28
End: 2018-01-22

## 2018-01-22 NOTE — TELEPHONE ENCOUNTER
----- Message from Zaynab Baptiste sent at 1/22/2018 11:51 AM CST -----  Contact: PETE BRISENO [[id number]]  x_  1st Request  _  2nd Request  _  3rd Request        Who: PETE BRISENO [[id number]]    Why: new patient states she would like a call to schedule an appt for a hormone consult as soon as possible.     What Number to Call Back: [phone]    When to Expect a call back: (Before the end of the day)   -- if call after 3:00 call back will be tomorrow.

## 2018-01-22 NOTE — TELEPHONE ENCOUNTER
Donald pt calling, pt states she was referred to dr Palacios and they can't get her in for 6 wks and wants to know if you could call or see if you can get her in sooner.Pt # [phone]

## 2018-01-22 NOTE — TELEPHONE ENCOUNTER
----- Message from Bere Farias sent at 1/22/2018  9:12 AM CST -----  Contact: Patient   X _1st Request  _  2nd Request  _  3rd Request    Who:PETE BRISENO [[id number]]    Why:Patient is requesting a call back she is schedule for 02/27/2018 and she is requesting to be seen sooner     What Number to Call Back:[phone]    When to Expect a call back: (Before the end of the day)   -- if call after 3:00 call back will be tomorrow.

## 2018-01-23 ENCOUNTER — TELEPHONE (OUTPATIENT)
Dept: OBSTETRICS AND GYNECOLOGY | Facility: CLINIC | Age: 28
End: 2018-01-23

## 2018-01-23 DIAGNOSIS — N94.10 DYSPAREUNIA IN FEMALE: Primary | ICD-10-CM

## 2018-01-23 NOTE — TELEPHONE ENCOUNTER
----- Message from Elvia Cortez sent at 1/22/2018  4:47 PM CST -----  Contact: self  Patient is requesting a call back to get an appointment, patient state she is being referred for hormones. Patient can be reached at [phone].

## 2018-01-23 NOTE — TELEPHONE ENCOUNTER
Donald pt, asking for referral to Dr Vilchis the hormone specialist. Pt says they will not let pt make an appt without a referral from Dr Bennett.

## 2018-01-24 NOTE — TELEPHONE ENCOUNTER
----- Message from Marbin Bee sent at 1/23/2018  4:45 PM CST -----  Contact: patient  x_ 1st Request   _ 2nd Request   _ 3rd Request     Who: PETE BRISENO [[id number]]    Why: patient is requesting a call back in reference to scheduling an appointment.  Referral is in the system from Dr Bennett.    What Number to Call Back: [phone]    When to Expect a call back: (Before the end of the day)   -- if call after 3:00 call back will be tomorrow.

## 2018-02-22 ENCOUNTER — TELEPHONE (OUTPATIENT)
Dept: OBSTETRICS AND GYNECOLOGY | Facility: CLINIC | Age: 28
End: 2018-02-22

## 2018-02-22 NOTE — TELEPHONE ENCOUNTER
----- Message from Sj Suggs sent at 2/22/2018  3:41 PM CST -----  Contact: pt  x_ 1st Request  _ 2nd Request  _ 3rd Request    Who: pt    Why: is asking for a earlier arrival time for her scheduled appointment    What Number to Call Back: [phone]    When to Expect a call back: (Before the end of the day)  -- if call after 3:00 call back will be tomorrow.

## 2018-02-22 NOTE — TELEPHONE ENCOUNTER
States just started new job and wanting to move appt to earlier time. Discussed vulva clinic schedule, no earlier appt at this time but can check back next Monday pm to see if any openings.

## 2018-02-27 ENCOUNTER — OFFICE VISIT (OUTPATIENT)
Dept: OBSTETRICS AND GYNECOLOGY | Facility: CLINIC | Age: 28
End: 2018-02-27
Attending: OBSTETRICS & GYNECOLOGY
Payer: COMMERCIAL

## 2018-02-27 VITALS
WEIGHT: 127.44 LBS | HEIGHT: 69 IN | SYSTOLIC BLOOD PRESSURE: 124 MMHG | BODY MASS INDEX: 18.88 KG/M2 | DIASTOLIC BLOOD PRESSURE: 84 MMHG

## 2018-02-27 DIAGNOSIS — B37.31 CANDIDIASIS OF VULVA AND VAGINA: ICD-10-CM

## 2018-02-27 DIAGNOSIS — N94.10 DYSPAREUNIA IN FEMALE: ICD-10-CM

## 2018-02-27 DIAGNOSIS — N90.89 VULVAR LESION: Primary | ICD-10-CM

## 2018-02-27 PROCEDURE — 87210 SMEAR WET MOUNT SALINE/INK: CPT | Mod: QW,S$GLB,, | Performed by: OBSTETRICS & GYNECOLOGY

## 2018-02-27 PROCEDURE — 99244 OFF/OP CNSLTJ NEW/EST MOD 40: CPT | Mod: S$GLB,,, | Performed by: OBSTETRICS & GYNECOLOGY

## 2018-02-27 PROCEDURE — 99999 PR PBB SHADOW E&M-EST. PATIENT-LVL II: CPT | Mod: PBBFAC,,, | Performed by: OBSTETRICS & GYNECOLOGY

## 2018-02-27 PROCEDURE — 87102 FUNGUS ISOLATION CULTURE: CPT

## 2018-02-27 RX ORDER — CLOBETASOL PROPIONATE 0.5 MG/G
OINTMENT TOPICAL
Qty: 30 G | Refills: 3 | Status: SHIPPED | OUTPATIENT
Start: 2018-02-27 | End: 2018-10-18

## 2018-02-27 NOTE — PROGRESS NOTES
Subjective:     Patient ID: Ainsley Mendez is a 27 y.o. female.     Chief Complaint: Vulva Lesion (ref Dr Bennett, pt states area is more like a tear and it comes and goes not always in the same spot) and Dyspareunia     History of Present Illness: This patient is a 27 y.o. female, who presents to the GYN Vulva clinic on referral from Dr. Bennett for evaluation of recurrent vulvar irritation and dyspareunia.  She discontinued the use of Seasonique several months ago, she now uses lubricated condoms for birth control.  Her menstrual cycles are regular and she experiences molimina prior to her prior to the onset of menses.       Patient's last menstrual period was 02/02/2018.    Review of Systems    GENERAL: No fever, chills, fatigability or weightchange  SKIN: No rashes, itching or changes in color or texture of skin.  HEAD: No headaches or recent head trauma.  EYES: Visual acuity fine. No photophobia,r diplopia.  EARS: Denies earache or vertigo  NOSE: No loss of smell, no epistaxis or postnasal drip.  MOUTH & THROAT: No hoarseness or change in voice.   NODES: Denies swollen glands.  CHEST: Denies ALEXANDER, cyanosis, wheezing, cough and sputum production.  CARDIOVASCULAR: Denies chest pain, PND, orthopnea or reduced exercise tolerance.  ABDOMEN: Appetite fine. No weight loss. bloating, Denies diarrhea, abdominal pain, hematemesis or blood in stool.  URINARY: No flank pain, dysuria or hematuria.  PERIPHERAL VASCULAR: No claudication or cyanosis.Varicosities  MUSCULOSKELETAL: No joint stiffness or swelling. Denies back pain.muscle aches  NEUROLOGIC: No history of seizures, paralysis, alteration of gait or coordination.       Objective:       Physical Exam     APPEARANCE: Well nourished, well developed, in no acute distress.    GENITOURINARY:  Vulva: No lesions. Normal female genital architecture.  Questionable thin, white plaques noted.  Urethral Meatus: Normal size and location, no lesions, no  prolapse.  Urethra: No masses, tenderness, prolapse or scarring.  Vagina: Moist with rugae, no discharge, no significant cystocele or rectocele.  Cervix: No lesions, normal diameter, no stenosis, no cervical motion tenderness. .  Uterus: 6 week size, regular shape, mobile, non-tender, normal position, good support.  Adnexa: No masses, tenderness or CDS nodularity.  Anus Perineum: No lesions, no relaxation, no external hemorrhoids.  Abdomen: No masses, tenderness, hernia or ascites, no hepatasplenomegaly  Skin: No rashes, lesions, ulcers, acne, hirsutism.  Peripheral/lower extremities: No edema, erythema or tenderness.  Lymphatic: No axillary, neck or groin nodes palp.  Mental Status: Alert, oriented x 3, normal affect and mood.          @PROCEDURE:@  Wet Prep:  pH = 4.4 menses beginning  -WBCS = rare occasional  -Lactobacilli = present  -BV = Amsel negative whiff test negative  -Candida = Hyphae none seen  -Trichomnas = none seen  -Cells- Basal and Parabasal, Superficial: Maturation: Superficial cells predominate  -Impression: Negative wet prep  -Treatment: None indicated  -[facility] in 6 weeks         Assessment:      1. Vulvar lesion (splits at 6:00 in the vestibule)    2. Dyspareunia in female    3. Candidiasis of vulva and vagina               Plan:  1.  Candida culture of the vagina taken.  2.  Discussed wet prep findings.  3.  No significant evidence of vulvar dermatosis, well some subtle changes were noted therefore a trial of clobetasol ointment therapy was suggested.  4.  Dr. Bennett will be notified of my findings.  5.  Return to clinic for follow-up in 6 weeks.                      No orders of the defined types were placed in this encounter.

## 2018-03-08 ENCOUNTER — TELEPHONE (OUTPATIENT)
Dept: OBSTETRICS AND GYNECOLOGY | Facility: CLINIC | Age: 28
End: 2018-03-08

## 2018-03-08 NOTE — TELEPHONE ENCOUNTER
----- Message from Dustin Macias MA sent at 3/7/2018  3:09 PM CST -----  pt needs to reschedule appt..please advise     What Number to Call Back: [phone]

## 2018-03-28 ENCOUNTER — HOSPITAL ENCOUNTER (OUTPATIENT)
Dept: RADIOLOGY | Facility: OTHER | Age: 28
Discharge: HOME | End: 2018-03-28
Attending: PLASTIC SURGERY
Payer: COMMERCIAL

## 2018-03-28 DIAGNOSIS — R68.84 JAW PAIN: ICD-10-CM

## 2018-03-28 LAB — FUNGUS SPEC CULT: NORMAL

## 2018-03-28 PROCEDURE — 70486 CT MAXILLOFACIAL W/O DYE: CPT | Mod: TC

## 2018-03-28 PROCEDURE — 70486 CT MAXILLOFACIAL W/O DYE: CPT | Mod: 26,,, | Performed by: RADIOLOGY

## 2018-05-03 ENCOUNTER — LAB VISIT (OUTPATIENT)
Dept: LAB | Facility: OTHER | Age: 28
End: 2018-05-03
Attending: OBSTETRICS & GYNECOLOGY
Payer: COMMERCIAL

## 2018-05-03 ENCOUNTER — OFFICE VISIT (OUTPATIENT)
Dept: OBSTETRICS AND GYNECOLOGY | Facility: CLINIC | Age: 28
End: 2018-05-03
Attending: OBSTETRICS & GYNECOLOGY
Payer: COMMERCIAL

## 2018-05-03 VITALS
SYSTOLIC BLOOD PRESSURE: 100 MMHG | WEIGHT: 129 LBS | BODY MASS INDEX: 19.11 KG/M2 | HEIGHT: 69 IN | DIASTOLIC BLOOD PRESSURE: 66 MMHG

## 2018-05-03 DIAGNOSIS — N95.2 VAGINAL ATROPHY: Primary | ICD-10-CM

## 2018-05-03 DIAGNOSIS — N95.2 VAGINAL ATROPHY: ICD-10-CM

## 2018-05-03 LAB — ESTRADIOL SERPL-MCNC: 34 PG/ML

## 2018-05-03 PROCEDURE — 82670 ASSAY OF TOTAL ESTRADIOL: CPT

## 2018-05-03 PROCEDURE — 3008F BODY MASS INDEX DOCD: CPT | Mod: CPTII,S$GLB,, | Performed by: OBSTETRICS & GYNECOLOGY

## 2018-05-03 PROCEDURE — 99214 OFFICE O/P EST MOD 30 MIN: CPT | Mod: S$GLB,,, | Performed by: OBSTETRICS & GYNECOLOGY

## 2018-05-03 PROCEDURE — 84402 ASSAY OF FREE TESTOSTERONE: CPT

## 2018-05-07 LAB — TESTOST FREE SERPL-MCNC: 0.8 PG/ML

## 2018-05-22 ENCOUNTER — TELEPHONE (OUTPATIENT)
Dept: OBSTETRICS AND GYNECOLOGY | Facility: CLINIC | Age: 28
End: 2018-05-22

## 2018-05-23 NOTE — TELEPHONE ENCOUNTER
----- Message from Dustin Macias MA sent at 5/18/2018 11:28 AM CDT -----  Contact: pt      ----- Message -----  From: Sj Suggs  Sent: 5/18/2018  10:32 AM  To: Deng HOWARD Staff              Name of Who is Calling: pt      What is the request in detail: test results      Can the clinic reply by [facility]: np      What Number to Call Back if not in [facility]: [phone]

## 2018-06-04 ENCOUNTER — OFFICE VISIT (OUTPATIENT)
Dept: URGENT CARE | Facility: CLINIC | Age: 28
End: 2018-06-04
Payer: COMMERCIAL

## 2018-06-04 VITALS
HEART RATE: 89 BPM | TEMPERATURE: 97 F | SYSTOLIC BLOOD PRESSURE: 103 MMHG | RESPIRATION RATE: 19 BRPM | DIASTOLIC BLOOD PRESSURE: 59 MMHG | OXYGEN SATURATION: 98 % | HEIGHT: 69 IN | BODY MASS INDEX: 18.96 KG/M2 | WEIGHT: 128 LBS

## 2018-06-04 DIAGNOSIS — A09 TRAVELER'S DIARRHEA: Primary | ICD-10-CM

## 2018-06-04 PROCEDURE — 99203 OFFICE O/P NEW LOW 30 MIN: CPT | Mod: S$GLB,,, | Performed by: FAMILY MEDICINE

## 2018-06-04 PROCEDURE — 3008F BODY MASS INDEX DOCD: CPT | Mod: CPTII,S$GLB,, | Performed by: FAMILY MEDICINE

## 2018-06-04 RX ORDER — DICYCLOMINE HYDROCHLORIDE 20 MG/1
20 TABLET ORAL EVERY 6 HOURS
Qty: 20 TABLET | Refills: 0 | Status: SHIPPED | OUTPATIENT
Start: 2018-06-04 | End: 2018-06-09

## 2018-06-04 RX ORDER — CIPROFLOXACIN 500 MG/1
500 TABLET ORAL 2 TIMES DAILY
Qty: 14 TABLET | Refills: 0 | Status: SHIPPED | OUTPATIENT
Start: 2018-06-04 | End: 2018-06-11

## 2018-06-04 NOTE — PROGRESS NOTES
"Subjective:       Patient ID: Ainsley Mendez is a 28 y.o. female.    Vitals:  height is 5' 9" (1.753 m) and weight is 58.1 kg (128 lb). Her oral temperature is 97.4 °F (36.3 °C). Her blood pressure is 103/59 (abnormal) and her pulse is 89. Her respiration is 19 and oxygen saturation is 98%.     Chief Complaint: Abdominal Pain    Abdominal Pain   This is a new problem. Episode onset: saturday 05/19/2018. The onset quality is sudden. The problem occurs constantly. The problem has been unchanged. The pain is located in the suprapubic region and periumbilical region. The pain is at a severity of 8/10. The pain is severe. The quality of the pain is cramping. The abdominal pain does not radiate. Associated symptoms include diarrhea. Pertinent negatives include no constipation, dysuria, fever, hematochezia, melena, nausea or vomiting. The pain is aggravated by eating and movement. The pain is relieved by nothing. She has tried acetaminophen for the symptoms. The treatment provided no relief.     Review of Systems   Constitution: Negative for chills and fever.   Cardiovascular: Negative for chest pain.   Respiratory: Negative for shortness of breath.    Musculoskeletal: Negative for back pain.   Gastrointestinal: Positive for abdominal pain and diarrhea. Negative for constipation, hematochezia, melena, nausea and vomiting.   Genitourinary: Negative for dysuria.       Objective:      Physical Exam   Constitutional: She is oriented to person, place, and time. She appears well-developed and well-nourished.   HENT:   Head: Normocephalic and atraumatic.   Eyes: EOM are normal. Pupils are equal, round, and reactive to light.   Neck: Normal range of motion. Neck supple. No JVD present. No tracheal deviation present. No thyromegaly present.   Cardiovascular: Normal rate, regular rhythm and normal heart sounds.  Exam reveals no gallop and no friction rub.    No murmur heard.  Pulmonary/Chest: Breath sounds normal. No " respiratory distress. She has no wheezes. She has no rales. She exhibits no tenderness.   Abdominal: Soft. Bowel sounds are normal. She exhibits no distension and no mass. There is no rebound and no guarding. No hernia.   Mild generalize abd tenderness.   Musculoskeletal: Normal range of motion. She exhibits no edema, tenderness or deformity.   Lymphadenopathy:     She has no cervical adenopathy.   Neurological: She is alert and oriented to person, place, and time. She displays normal reflexes. No cranial nerve deficit. She exhibits normal muscle tone. Coordination normal.   Skin: Skin is warm. Capillary refill takes less than 2 seconds. No rash noted. No erythema. No pallor.   Psychiatric: She has a normal mood and affect. Her behavior is normal. Judgment and thought content normal.   Vitals reviewed.      Assessment:       1. Traveler's diarrhea        Plan:         Traveler's diarrhea  -     ciprofloxacin HCl (CIPRO) 500 MG tablet; Take 1 tablet (500 mg total) by mouth 2 (two) times daily.  Dispense: 14 tablet; Refill: 0  -     dicyclomine (BENTYL) 20 mg tablet; Take 1 tablet (20 mg total) by mouth every 6 (six) hours.  Dispense: 20 tablet; Refill: 0      Follow up with your doctor in a few days as needed.  Return to the urgent care or go to the ER if symptoms get worse.    Papa Alejo MD

## 2018-06-04 NOTE — PATIENT INSTRUCTIONS
Traveler's Diarrhea (Adult)    Traveler's diarrhea is an infection in the intestinal tract caused by bacteria called E coli. This bacteria is commonly found in water supplies of developing countries. The local people of those countries are used to E coli in the water and don't get sick. Tourists who drink contaminated water or eat foods that were washed or prepared with this water may become very ill.  The illness begins 1 to 3 days after exposure and lasts up to 5 days. Symptoms include fever, vomiting, stomach cramping and watery diarrhea. There may also be blood or mucus in the stool. Mild cases will get better without treatment. Antibiotics are used for more severe cases.  Home care  · If you were prescribed antibiotics,  take them until they are finished.  · You may use acetaminophen or ibuprofen to control fever, unless another medicine was prescribed. If you have chronic liver or kidney disease or have ever had a stomach ulcer or gastrointestinal  bleeding, talk with the doctor before using these medicines. Aspirin should never be used in anyone under 18 years of age who is ill with a fever. It may cause severe illness or death.  · Do not take over-the-counter anti-diarrheal medicines, unless advised by the doctor.  Once vomiting stops, follow these guidelines:  During the first 12 to 24 hours, follow the diet below:  · Fruit juices. Apple, grape and cranberry juice; clear fruit drinks, electrolyte replacement and sports drinks.  · Beverages. Soft drinks without caffeine; mineral water (plain or flavored); decaffeinated tea and coffee  · Soups. Clear broth, consommé and bouillon.  · Desserts. Plain gelatin, popsicles and fruit juice bars; As you feel better, you may add 6 to 8 oz of yogurt per day.  During the next 24 hours, you may add the following to the above:  · Hot cereal, plain toast, bread, rolls, crackers  · Plain noodles, rice, mashed potatoes, chicken noodle or rice soup  · Unsweetened canned  fruit (avoid pineapple), bananas  · Limit fat intake to less than 15 grams per day by avoiding margarine, butter, oils, mayonnaise, sauces, gravies, fried foods, peanut butter, meat, poultry and fish.  · Limit fiber; avoid raw or cooked vegetables, fresh fruits (except bananas) and bran cereals.  · Limit caffeine and chocolate. No spices or seasonings except salt.  During the next 24 hours you can gradually resume a normal diet as the symptoms lessen.  Follow-up care  Follow up with your healthcare provider, or as advised. Call if you are not improving within 24 hours or if the diarrhea lasts more than 1 week on antibiotics. If a stool (diarrhea) sample was taken, you may call in 2 days (or as directed) for the results.  When to seek medical advice  Call your healthcare provider if any of these occur:  · Severe constant pain in the lower part of the abdomen, on the right side  · Blood in diarrhea or vomit, dark coffee ground appearing vomit, or dark tarry stools  · continued vomiting (unable to keep liquids down)  · Frequent diarrhea (more than 5 times a day); blood (red or black) or mucus in diarrhea  · Reduced oral intake  · Reduced urine output or extreme thirst  · Weakness, dizziness, fainting  · Drowsiness, confusion, stiff neck, or seizure  · Fever (1 degree above your normal temperature) lasting 24 to 48 hours, or whatever your healthcare provider told you to report based on your condition  Date Last Reviewed: 11/16/2015  © 5235-8556 [employer]. [address] [place] [address], [place] [address]. All rights reserved. This information is not intended as a substitute for professional medical care. Always follow your healthcare professional's instructions.    Follow up with your doctor in a few days.  Return to the urgent care or go to the ER if symptoms get worse.    Papa Alejo MD

## 2018-06-25 ENCOUNTER — TELEPHONE (OUTPATIENT)
Dept: OBSTETRICS AND GYNECOLOGY | Facility: CLINIC | Age: 28
End: 2018-06-25

## 2018-06-25 DIAGNOSIS — N93.9 ABNORMAL UTERINE BLEEDING: Primary | ICD-10-CM

## 2018-06-25 NOTE — TELEPHONE ENCOUNTER
----- Message from Glo Villareal sent at 6/25/2018  9:07 AM CDT -----  Contact: self  Pt called wanting to speak with someone because she's had two cycles this month. She can be reached at [phone]

## 2018-06-25 NOTE — TELEPHONE ENCOUNTER
Spoke with patient whom complained of abnormal bleeding before cycle. Patient states she is not taking OCP anymore. Patient was advised to monitor it for another month and we will discuss at f/u appointment.

## 2018-07-19 ENCOUNTER — OFFICE VISIT (OUTPATIENT)
Dept: OBSTETRICS AND GYNECOLOGY | Facility: CLINIC | Age: 28
End: 2018-07-19
Attending: OBSTETRICS & GYNECOLOGY
Payer: COMMERCIAL

## 2018-07-19 ENCOUNTER — LAB VISIT (OUTPATIENT)
Dept: LAB | Facility: OTHER | Age: 28
End: 2018-07-19
Attending: OBSTETRICS & GYNECOLOGY
Payer: COMMERCIAL

## 2018-07-19 VITALS
BODY MASS INDEX: 19.59 KG/M2 | HEIGHT: 69 IN | DIASTOLIC BLOOD PRESSURE: 60 MMHG | WEIGHT: 132.25 LBS | SYSTOLIC BLOOD PRESSURE: 102 MMHG

## 2018-07-19 DIAGNOSIS — N95.2 VAGINAL ATROPHY: ICD-10-CM

## 2018-07-19 DIAGNOSIS — N95.2 VAGINAL ATROPHY: Primary | ICD-10-CM

## 2018-07-19 LAB
ESTRADIOL SERPL-MCNC: 124 PG/ML
FSH SERPL-ACNC: 2.3 MIU/ML
PROGEST SERPL-MCNC: 7.4 NG/ML
PROLACTIN SERPL IA-MCNC: 17.1 NG/ML
T3FREE SERPL-MCNC: 2.8 PG/ML
T4 FREE SERPL-MCNC: 0.91 NG/DL
TSH SERPL DL<=0.005 MIU/L-ACNC: 0.65 UIU/ML

## 2018-07-19 PROCEDURE — 84481 FREE ASSAY (FT-3): CPT

## 2018-07-19 PROCEDURE — 84402 ASSAY OF FREE TESTOSTERONE: CPT

## 2018-07-19 PROCEDURE — 84144 ASSAY OF PROGESTERONE: CPT

## 2018-07-19 PROCEDURE — 99213 OFFICE O/P EST LOW 20 MIN: CPT | Mod: S$GLB,,, | Performed by: OBSTETRICS & GYNECOLOGY

## 2018-07-19 PROCEDURE — 84439 ASSAY OF FREE THYROXINE: CPT

## 2018-07-19 PROCEDURE — 82670 ASSAY OF TOTAL ESTRADIOL: CPT

## 2018-07-19 PROCEDURE — 3008F BODY MASS INDEX DOCD: CPT | Mod: CPTII,S$GLB,, | Performed by: OBSTETRICS & GYNECOLOGY

## 2018-07-19 PROCEDURE — 83001 ASSAY OF GONADOTROPIN (FSH): CPT

## 2018-07-19 PROCEDURE — 84146 ASSAY OF PROLACTIN: CPT

## 2018-07-19 PROCEDURE — 84443 ASSAY THYROID STIM HORMONE: CPT

## 2018-07-19 NOTE — PROGRESS NOTES
Subjective:       Patient ID: Ainsley Mendez is a 28 y.o. female.    Chief Complaint:  Follow-up      History of Present Illness  HPI  This 28 yr old female is here for follow up on using atrophy protocol for vaginal tears that were culture neg but kept tearing with intercourse.  She was on OCPs and got better after she stopped but was still issue.  Libido got better off OCPs as well.  She did atrophy protocol with estrace and was doing better but stopped.  Now worse again.  We disucssed and not really using vibrator to help stretch the tissue.  She might do better with intrarosa and after discussion will change to this.  She states she did well on Seasonique but does not want to take OCPs right now    GYN & OB History  Patient's last menstrual period was 2018.   Date of Last Pap: No result found    OB History    Para Term  AB Living   0 0 0 0 0 0   SAB TAB Ectopic Multiple Live Births   0 0 0 0               Review of Systems  Review of Systems   Constitutional: Negative for chills and fever.   Respiratory: Negative for shortness of breath.    Cardiovascular: Negative for chest pain.   Gastrointestinal: Negative for abdominal pain, nausea and vomiting.   Genitourinary: Negative for difficulty urinating, dyspareunia, genital sores, menstrual problem, pelvic pain, vaginal bleeding, vaginal discharge and vaginal pain.   Skin: Negative for wound.   Hematological: Negative for adenopathy.           Objective:    Physical Exam       Assessment:        1. Vaginal atrophy               Plan:      Change to DHEA ovules and discussed that this might take 6 months to yr of treatment to get back to normal as took that long to get to this point.

## 2018-07-23 LAB — TESTOST FREE SERPL-MCNC: 0.9 PG/ML

## 2018-07-31 ENCOUNTER — PATIENT MESSAGE (OUTPATIENT)
Dept: OBSTETRICS AND GYNECOLOGY | Facility: CLINIC | Age: 28
End: 2018-07-31

## 2018-08-13 ENCOUNTER — OFFICE VISIT (OUTPATIENT)
Dept: OBSTETRICS AND GYNECOLOGY | Facility: CLINIC | Age: 28
End: 2018-08-13
Payer: COMMERCIAL

## 2018-08-13 VITALS
DIASTOLIC BLOOD PRESSURE: 74 MMHG | SYSTOLIC BLOOD PRESSURE: 116 MMHG | HEIGHT: 69 IN | BODY MASS INDEX: 19.51 KG/M2 | WEIGHT: 131.75 LBS

## 2018-08-13 DIAGNOSIS — Z12.4 ENCOUNTER FOR SCREENING FOR CERVICAL CANCER: Primary | ICD-10-CM

## 2018-08-13 PROCEDURE — 3008F BODY MASS INDEX DOCD: CPT | Mod: CPTII,S$GLB,, | Performed by: OBSTETRICS & GYNECOLOGY

## 2018-08-13 PROCEDURE — 99213 OFFICE O/P EST LOW 20 MIN: CPT | Mod: S$GLB,,, | Performed by: OBSTETRICS & GYNECOLOGY

## 2018-08-13 PROCEDURE — 88175 CYTOPATH C/V AUTO FLUID REDO: CPT

## 2018-08-13 PROCEDURE — 99999 PR PBB SHADOW E&M-EST. PATIENT-LVL III: CPT | Mod: PBBFAC,,, | Performed by: OBSTETRICS & GYNECOLOGY

## 2018-08-13 RX ORDER — ESTRADIOL 0.1 MG/G
CREAM VAGINAL DAILY
COMMUNITY
End: 2020-04-24

## 2018-08-13 NOTE — PROGRESS NOTES
"  Chief Complaint: Well Woman Exam, Dyspareunia     HPI:      Ainsley Mendez is a 28 y.o.  who presents for annual exam. She is currently complaining of dyspareunia. In addition to the superficial ulcerations she has been experiencing for the past 2 years, she is now having pain on occasion with deep penetration. Denies abnormal bleeding or discharge. Has been seeing Dr. Vilchis who told her that her testosterone levels were low. She is unsure if Dr. Vilchis is planning on starting supplementation. She was Rx'd Intrarosa but has not yet started it because she read that it could make her pap abnormal.    Ms. Mendez is currently sexually active with a single male partner. She declines STD screening today. Patient has regular monthly menses. Patient's last menstrual period was 2018. She is currently using natural family planning (NFP) for contraception.    Previous Pap:  no abnormalities per patient () - prior to this patient had multiple abnormals with colpo but never any treatment needed.     Ms. Mendez confirms that she wears her seatbelt when riding in the car and does text while driving.     OB History      Para Term  AB Living    0 0 0 0 0 0    SAB TAB Ectopic Multiple Live Births    0 0 0 0          ROS:     GENERAL: Denies unintentional weight gain or weight loss. Feeling well overall.   SKIN: Denies rash or lesions.   HEENT: Denies headaches, or vision changes.   CARDIOVASCULAR: Denies palpitations or chest pain.   RESPIRATORY: Denies shortness of breath or dyspnea on exertion.  BREASTS: Denies pain, lumps, or nipple discharge.   ABDOMEN: Denies abdominal pain, constipation, diarrhea, nausea, vomiting, or change in appetite.   URINARY: Denies frequency, dysuria, hematuria.  NEUROLOGIC: Denies syncope or weakness.   PSYCHIATRIC: Denies depression, anxiety or mood swings.    Physical Exam:      PHYSICAL EXAM:  /74   Ht 5' 9" (1.753 m)   Wt 59.8 kg (131 lb 11.6 oz)   " LMP 07/23/2018   BMI 19.45 kg/m²   Body mass index is 19.45 kg/m².     APPEARANCE: Well nourished, well developed, in no acute distress.  PSYCH: Appropriate mood and affect.  SKIN: No acne or hirsutism  NECK: Neck symmetric without masses or thyromegaly  NODES: No inguinal, axillary, or supraclavicular lymph node enlargement  CHEST: Normal respiratory effort.  ABDOMEN: Soft.  No tenderness or masses.   BREASTS: Symmetrical, no skin changes or visible lesions.  No palpable masses or nipple discharge bilaterally.  PELVIC: Normal external genitalia without lesions.  Normal hair distribution.  Adequate perineal body, normal urethral meatus.  Vagina moist and well rugated. Small superficial ulceration (c/w previous lesions) in the left side just within the introitus. Cervix pink, without lesions, discharge or tenderness.  No significant cystocele or rectocele.  Bimanual exam shows uterus to be normal size, regular, mobile and nontender.  Adnexa without masses or tenderness.  Patient's deep pain could not be elicited on today's exam.  EXTREMITIES: No edema.    Assessment/Plan:     Encounter for screening for cervical cancer   -     Liquid-based pap smear, screening    Dyspareunia         -     Recommended patient try the intrarosa to help with vaginal atrophy          -     After superficial ulcerations healed, if she's still having dyspareunia with deep penetration, recommend pelvic floor PT. This was discussed briefly with patient today.    Counseling:     Patient was counseled today on current ASCCP pap guidelines, the recommendation for yearly pelvic exams, healthy diet and exercise routines, breast self awareness. Safe driving practices discussed. She is to see her PCP for other health maintenance.       Use of the [facility] Patient Portal discussed and encouraged during today's visit.

## 2018-08-15 ENCOUNTER — PATIENT MESSAGE (OUTPATIENT)
Dept: OBSTETRICS AND GYNECOLOGY | Facility: CLINIC | Age: 28
End: 2018-08-15

## 2018-09-06 ENCOUNTER — TELEPHONE (OUTPATIENT)
Dept: OBSTETRICS AND GYNECOLOGY | Facility: CLINIC | Age: 28
End: 2018-09-06

## 2018-09-06 NOTE — TELEPHONE ENCOUNTER
Donald pt - pt would like to come in for an appt to discuss some problems she has been having with . She said she is having issues with a hormone she is taking and also she has anxiety that she thinks is causing heart palpitations.

## 2018-09-06 NOTE — TELEPHONE ENCOUNTER
Pt started on Intrarosa several weeks ago.  She has recently started feeling very anxious and heart palpitations.  Requesting an appt to discuss with Dr. Bennett.   scheduled tomorrow

## 2018-09-07 ENCOUNTER — OFFICE VISIT (OUTPATIENT)
Dept: OBSTETRICS AND GYNECOLOGY | Facility: CLINIC | Age: 28
End: 2018-09-07
Payer: COMMERCIAL

## 2018-09-07 VITALS
BODY MASS INDEX: 19.5 KG/M2 | SYSTOLIC BLOOD PRESSURE: 118 MMHG | DIASTOLIC BLOOD PRESSURE: 70 MMHG | HEIGHT: 69 IN | WEIGHT: 131.63 LBS

## 2018-09-07 DIAGNOSIS — F41.9 ANXIETY: Primary | ICD-10-CM

## 2018-09-07 PROCEDURE — 99212 OFFICE O/P EST SF 10 MIN: CPT | Mod: S$PBB,,, | Performed by: OBSTETRICS & GYNECOLOGY

## 2018-09-07 PROCEDURE — 99999 PR PBB SHADOW E&M-EST. PATIENT-LVL III: CPT | Mod: PBBFAC,,, | Performed by: OBSTETRICS & GYNECOLOGY

## 2018-09-07 NOTE — PROGRESS NOTES
"  Chief Complaint: Anxiety     HPI:      Ainsley Mendez is a 28 y.o.  who presents complaining of anxiety. She is unsure if it's due to psychological stressors (getting  in November, having significant family issues) or if it's related to her Intrarosa therapy, as she feels that the anxiety may have increased when she started the medication.  Ms. Mendez is currently sexually active with a single male partner.Patient has regular monthly menses. Patient's last menstrual period was 2018 (exact date). States that intrarosa has not entirely resolved her dyspareunia, but has improved it. Had a panic attack this week. Has never had anything similar happen before. No SI/HI.    ROS:     GENERAL: Denies fevers or chills. Feeling well overall.   ABDOMEN: Denies abdominal pain, constipation, diarrhea, nausea, vomiting, change in appetite.   URINARY: Denies frequency, dysuria, hematuria.  NEUROLOGIC: Denies syncope or weakness.     Physical Exam:      PHYSICAL EXAM:  /70   Ht 5' 9" (1.753 m)   Wt 59.7 kg (131 lb 9.8 oz)   LMP 2018 (Exact Date)   BMI 19.44 kg/m²   Body mass index is 19.44 kg/m².     APPEARANCE: Well nourished, well developed, in no acute distress.  PSYCH: Tearful mood, appropriate affect    Assessment/Plan:     Anxiety  - Discussed that there is no real physiologic mechanism by with Intrarosa should contribute to mood symptoms.   - Together reviewed the FDA listed SE from Intrarosa, none of which are psychological.   - Referred to [facility] for therapy.        "

## 2018-10-18 ENCOUNTER — OFFICE VISIT (OUTPATIENT)
Dept: OBSTETRICS AND GYNECOLOGY | Facility: CLINIC | Age: 28
End: 2018-10-18
Attending: OBSTETRICS & GYNECOLOGY
Payer: COMMERCIAL

## 2018-10-18 VITALS
HEIGHT: 69 IN | BODY MASS INDEX: 19.73 KG/M2 | WEIGHT: 133.19 LBS | SYSTOLIC BLOOD PRESSURE: 110 MMHG | DIASTOLIC BLOOD PRESSURE: 64 MMHG

## 2018-10-18 DIAGNOSIS — N89.8 VAGINAL ITCHING: Primary | ICD-10-CM

## 2018-10-18 DIAGNOSIS — R10.2 VAGINAL PAIN: ICD-10-CM

## 2018-10-18 PROCEDURE — 3008F BODY MASS INDEX DOCD: CPT | Mod: CPTII,S$GLB,, | Performed by: OBSTETRICS & GYNECOLOGY

## 2018-10-18 PROCEDURE — 99213 OFFICE O/P EST LOW 20 MIN: CPT | Mod: S$GLB,,, | Performed by: OBSTETRICS & GYNECOLOGY

## 2018-10-18 RX ORDER — LIDOCAINE HYDROCHLORIDE 20 MG/ML
JELLY TOPICAL
Qty: 30 ML | Refills: 1 | Status: SHIPPED | OUTPATIENT
Start: 2018-10-18 | End: 2019-10-18

## 2018-10-18 RX ORDER — CLOBETASOL PROPIONATE 0.5 MG/G
OINTMENT TOPICAL 2 TIMES DAILY
Qty: 30 G | Refills: 2 | Status: SHIPPED | OUTPATIENT
Start: 2018-10-18 | End: 2018-10-28

## 2018-10-18 NOTE — PROGRESS NOTES
"Subjective:       Patient ID: Ainsley Mendez is a 28 y.o. female.    Chief Complaint:  Follow-up (f/u vaginal atrophy - Doing well estrace, stopped taking Intrarosa about 3 weeks was helping in some ways but in alot of ways would like to discuss on today, saw that it was not bioidentical)      History of Present Illness  HPI  This 28 yr old female is here to discuss her vaginal atrophy.  She did better with the intrarosa but stopped as noted not bioident???  Not using the estrace like she should and the "tear" is back.  Same problem as before.  We dicussed diligence is the name of this game and she understands.  Getting  and just wants to get better.     GYN & OB History  Patient's last menstrual period was 10/04/2018.   Date of Last Pap: 8/15/2018    OB History    Para Term  AB Living   0 0 0 0 0 0   SAB TAB Ectopic Multiple Live Births   0 0 0 0               Review of Systems  Review of Systems   Constitutional: Negative for chills and fever.   Respiratory: Negative for shortness of breath.    Cardiovascular: Negative for chest pain.   Gastrointestinal: Negative for abdominal pain, nausea and vomiting.   Genitourinary: Positive for dyspareunia. Negative for difficulty urinating, genital sores, menstrual problem, pelvic pain, vaginal bleeding, vaginal discharge and vaginal pain.   Skin: Negative for wound.   Hematological: Negative for adenopathy.           Objective:   Physical Exam       Assessment:        1. Vaginal itching    2. Vaginal pain               Plan:      Will use estrace cream and cortisol cream and gave her some lidocaine gel for honeymoon.   Follow up       "

## 2018-10-23 ENCOUNTER — PATIENT MESSAGE (OUTPATIENT)
Dept: OBSTETRICS AND GYNECOLOGY | Facility: CLINIC | Age: 28
End: 2018-10-23

## 2019-05-01 NOTE — PROGRESS NOTES
Subjective:       Patient ID: Ainsley Mendez is a 28 y.o. female.    Chief Complaint:  No chief complaint on file.      History of Present Illness  HPI  This 28 yr old P0 female is here for discussion of vulvar changes (tear) that she has been seen by Dr Palacios and neg for yeast (culture and affirm) .  She had been on OCPs (seasonique) but stopped this about 6 months ago.  She did have several  Tears and has generally gotten better as now only has one. She states her libido has also improved.  Most likely atrophy from OCPs. (non 17 B Estradiol estrogen) Pt states does heal but she will have intercourse within days and back where she started.  Using lubrication.  We had very long discussion about this and that she would need to use atrophy protocol and not have intercourse for few weeks and only then if she is doing well with stretching with vibrator. Pt understands and agrees    GYN & OB History  No LMP recorded.   Date of Last Pap: No result found    OB History    Para Term  AB Living   0 0 0 0 0 0   SAB TAB Ectopic Multiple Live Births   0 0 0 0               Review of Systems  Review of Systems   Constitutional: Negative for chills and fever.   Respiratory: Negative for shortness of breath.    Cardiovascular: Negative for chest pain.   Gastrointestinal: Negative for abdominal pain, nausea and vomiting.   Genitourinary: Positive for dyspareunia. Negative for difficulty urinating, genital sores, menstrual problem, pelvic pain, vaginal bleeding, vaginal discharge and vaginal pain.   Skin: Negative for wound.   Hematological: Negative for adenopathy.           Objective:    Physical Exam:               Genitourinary:                              Assessment:        1. Vaginal atrophy               Plan:      Atrophy protocol  Follow up in 2 to 3 months for discussion or change in plan.  She might do well on long term DHEA ovules  We spent over 25 min and over 50% in counseling     
VIEW ALL

## 2019-05-10 ENCOUNTER — OFFICE VISIT (OUTPATIENT)
Dept: OBSTETRICS AND GYNECOLOGY | Facility: CLINIC | Age: 29
End: 2019-05-10
Payer: COMMERCIAL

## 2019-05-10 VITALS
DIASTOLIC BLOOD PRESSURE: 84 MMHG | HEIGHT: 69 IN | BODY MASS INDEX: 20.11 KG/M2 | WEIGHT: 135.81 LBS | SYSTOLIC BLOOD PRESSURE: 106 MMHG

## 2019-05-10 DIAGNOSIS — Z31.69 PRE-CONCEPTION COUNSELING: ICD-10-CM

## 2019-05-10 DIAGNOSIS — R10.32 LLQ PAIN: ICD-10-CM

## 2019-05-10 DIAGNOSIS — Z01.419 ENCOUNTER FOR ANNUAL ROUTINE GYNECOLOGICAL EXAMINATION: Primary | ICD-10-CM

## 2019-05-10 PROCEDURE — 99999 PR PBB SHADOW E&M-EST. PATIENT-LVL III: ICD-10-PCS | Mod: PBBFAC,,, | Performed by: OBSTETRICS & GYNECOLOGY

## 2019-05-10 PROCEDURE — 99999 PR PBB SHADOW E&M-EST. PATIENT-LVL III: CPT | Mod: PBBFAC,,, | Performed by: OBSTETRICS & GYNECOLOGY

## 2019-05-10 PROCEDURE — 99395 PR PREVENTIVE VISIT,EST,18-39: ICD-10-PCS | Mod: S$GLB,,, | Performed by: OBSTETRICS & GYNECOLOGY

## 2019-05-10 PROCEDURE — 99395 PREV VISIT EST AGE 18-39: CPT | Mod: S$GLB,,, | Performed by: OBSTETRICS & GYNECOLOGY

## 2019-05-10 RX ORDER — FLUTICASONE PROPIONATE 50 MCG
SPRAY, SUSPENSION (ML) NASAL
COMMUNITY
End: 2019-05-10

## 2019-05-10 RX ORDER — CLOBETASOL PROPIONATE 0.5 MG/G
OINTMENT TOPICAL
COMMUNITY
End: 2020-04-24

## 2019-05-10 NOTE — PROGRESS NOTES
Chief Complaint: Well Woman Exam, LLQ Pain, Continued Vaginal Tearing     HPI:      Ainsley Mendez is a 29 y.o.  who presents for annual exam. She is currently complaining of LLQ pain for the past 2 months. Initially thought it might be GI related as her mother was just dx'd with diverticulitis and pain seemed to be worse right before and relieve during BMs. She saw a GI doc who told her she was too young for diverticulitis and that this was likely Gyn in nature. She does have increased discomfort during her menses as well. Reports the pain is sharp and stabbing and lasts a few hours a time. No associated symptoms. Pain is slightly better now that she is working to decrease her constipation. Has a h/o ovarian cyst in high school for which she was placed on OCPs. That cyst resolved on its own though surgery was recommended for it due to size.    She also continues to have vaginal tearing after most episodes of intercourse, with varying degrees of bleeding. She states that she is no longer having significant pain however. She is no longer using the vaginal estrogen, intrarosa, or steroid cream.    Ms. Mendez is currently sexually active with a single male partner. She declines STD screening today. Patient has regular monthly menses. Patient's last menstrual period was 04/10/2019 (approximate). She is currently using natural family planning (NFP) for contraception.    Planning to start trying to conceive in the next year.    Previous Pap:  NILM (8/15/2018)    Ms. Mendez confirms that she wears her seatbelt when riding in the car and does text while driving.     OB History        0    Para   0    Term   0       0    AB   0    Living   0       SAB   0    TAB   0    Ectopic   0    Multiple   0    Live Births                     ROS:     GENERAL: Denies unintentional weight gain or weight loss. Feeling well overall.   SKIN: Denies rash or lesions.   HEENT: Denies headaches, or vision changes.  "  CARDIOVASCULAR: Denies palpitations or chest pain.   RESPIRATORY: Denies shortness of breath or dyspnea on exertion.  BREASTS: Denies pain, lumps, or nipple discharge.   ABDOMEN: Denies abdominal pain, constipation, diarrhea, nausea, vomiting, or change in appetite.   URINARY: Denies frequency, dysuria, hematuria.  NEUROLOGIC: Denies syncope or weakness.   PSYCHIATRIC: Denies depression, anxiety or mood swings.    Physical Exam:      PHYSICAL EXAM:  /84   Ht 5' 9" (1.753 m)   Wt 61.6 kg (135 lb 12.9 oz)   LMP 04/10/2019 (Approximate)   BMI 20.05 kg/m²   Body mass index is 20.05 kg/m².     APPEARANCE: Well nourished, well developed, in no acute distress.  PSYCH: Appropriate mood and affect.  SKIN: No acne or hirsutism  NECK: Neck symmetric without masses or thyromegaly  NODES: No inguinal, axillary, or supraclavicular lymph node enlargement  CHEST: Normal respiratory effort.  ABDOMEN: Soft.  No tenderness or masses.   BREASTS: Symmetrical, no skin changes or visible lesions.  No palpable masses or nipple discharge bilaterally.  PELVIC: Normal external genitalia without lesions.  Normal hair distribution.  Adequate perineal body, normal urethral meatus.  Vagina moist and well rugated without lesions or discharge.  Cervix pink, without lesions, discharge or tenderness.  No significant cystocele or rectocele.  Bimanual exam shows uterus to be normal size, regular, mobile and nontender.  Adnexa without masses or tenderness.  No evidence of recent vaginal tears today.  EXTREMITIES: No edema.    Assessment/Plan:     Encounter for annual routine gynecological examination    Pre-conception counseling    LLQ pain   - Recommended U/S for patient but she is concerned about cost and declines at this time.   - Instructed that if pain worsens or persists she should come back immediately.   - Discussed lifestyle modifications to decrease constipation.  - Discussed potential of ovarian torsion and how this can be a " serious issue that effects future fertility - she voiced understanding and still wishes to delay U/S.    Counseling:     Patient was counseled today on current ASCCP pap guidelines, the recommendation for yearly pelvic exams, healthy diet and exercise routines, breast self awareness. Safe driving practices discussed. She is to see her PCP for other health maintenance.     Pre-Conception Counseling  Patient was counseled today on proper weight gain during pregnancy based on the [employer] [facility]'s recommendations. Healthy diet emphasized.   Recommended prenatal vitamins with folic acid starting at least 3 months prior to conception.  Safety of exercise discussed with patient, and continued active lifestyle encouraged.   Zika virus precautions for both patient and her spouse reviewed, and patient was advised to let us know if she has any flu like sx in the 6 months prior to conception.  Avoidance of tobacco and alcohol when trying to conceive were discussed.   Optimal timing of intercourse reviewed.     Use of the [facility] Patient Portal discussed and encouraged during today's visit.

## 2019-05-31 ENCOUNTER — PATIENT MESSAGE (OUTPATIENT)
Dept: OBSTETRICS AND GYNECOLOGY | Facility: CLINIC | Age: 29
End: 2019-05-31

## 2019-07-11 ENCOUNTER — PATIENT MESSAGE (OUTPATIENT)
Dept: OBSTETRICS AND GYNECOLOGY | Facility: CLINIC | Age: 29
End: 2019-07-11

## 2019-07-11 DIAGNOSIS — R10.2 PELVIC PAIN: Primary | ICD-10-CM

## 2019-07-15 ENCOUNTER — PATIENT MESSAGE (OUTPATIENT)
Dept: OBSTETRICS AND GYNECOLOGY | Facility: CLINIC | Age: 29
End: 2019-07-15

## 2019-07-17 ENCOUNTER — TELEPHONE (OUTPATIENT)
Dept: OBSTETRICS AND GYNECOLOGY | Facility: CLINIC | Age: 29
End: 2019-07-17

## 2019-07-17 NOTE — TELEPHONE ENCOUNTER
Ob Patient had an ultrasound today and would like to know if Dr. Bennett has reviewed them yet? Informed patient physician is still seeing patients till 5pm

## 2019-07-18 ENCOUNTER — TELEPHONE (OUTPATIENT)
Dept: OBSTETRICS AND GYNECOLOGY | Facility: CLINIC | Age: 29
End: 2019-07-18

## 2019-07-18 NOTE — TELEPHONE ENCOUNTER
Pt was called and notified that Dr Bennett is out of the office and Dr Bennett or her nurse will call her with results of the u/s she had yesterday. Pt understood

## 2019-07-18 NOTE — TELEPHONE ENCOUNTER
Dr. Bennett pt, requesting if Dr. Bennett can please call her back with her ultrasound results she had completed yesterday. Please call pt and advise, thank you!

## 2019-07-19 ENCOUNTER — PATIENT MESSAGE (OUTPATIENT)
Dept: OBSTETRICS AND GYNECOLOGY | Facility: CLINIC | Age: 29
End: 2019-07-19

## 2019-07-30 ENCOUNTER — OFFICE VISIT (OUTPATIENT)
Dept: OBSTETRICS AND GYNECOLOGY | Facility: CLINIC | Age: 29
End: 2019-07-30
Payer: COMMERCIAL

## 2019-07-30 VITALS
WEIGHT: 136 LBS | BODY MASS INDEX: 20.14 KG/M2 | HEIGHT: 69 IN | SYSTOLIC BLOOD PRESSURE: 122 MMHG | DIASTOLIC BLOOD PRESSURE: 74 MMHG

## 2019-07-30 DIAGNOSIS — R10.32 LLQ PAIN: Primary | ICD-10-CM

## 2019-07-30 PROCEDURE — 99213 PR OFFICE/OUTPT VISIT, EST, LEVL III, 20-29 MIN: ICD-10-PCS | Mod: S$GLB,,, | Performed by: OBSTETRICS & GYNECOLOGY

## 2019-07-30 PROCEDURE — 87088 URINE BACTERIA CULTURE: CPT

## 2019-07-30 PROCEDURE — 99999 PR PBB SHADOW E&M-EST. PATIENT-LVL III: CPT | Mod: PBBFAC,,, | Performed by: OBSTETRICS & GYNECOLOGY

## 2019-07-30 PROCEDURE — 87186 SC STD MICRODIL/AGAR DIL: CPT

## 2019-07-30 PROCEDURE — 99999 PR PBB SHADOW E&M-EST. PATIENT-LVL III: ICD-10-PCS | Mod: PBBFAC,,, | Performed by: OBSTETRICS & GYNECOLOGY

## 2019-07-30 PROCEDURE — 3008F BODY MASS INDEX DOCD: CPT | Mod: CPTII,S$GLB,, | Performed by: OBSTETRICS & GYNECOLOGY

## 2019-07-30 PROCEDURE — 87077 CULTURE AEROBIC IDENTIFY: CPT

## 2019-07-30 PROCEDURE — 3008F PR BODY MASS INDEX (BMI) DOCUMENTED: ICD-10-PCS | Mod: CPTII,S$GLB,, | Performed by: OBSTETRICS & GYNECOLOGY

## 2019-07-30 PROCEDURE — 99213 OFFICE O/P EST LOW 20 MIN: CPT | Mod: S$GLB,,, | Performed by: OBSTETRICS & GYNECOLOGY

## 2019-07-30 PROCEDURE — 87086 URINE CULTURE/COLONY COUNT: CPT

## 2019-07-30 NOTE — PROGRESS NOTES
"  Chief Complaint: LLQ Pain     HPI:      Ainsley Garduno is a 29 y.o. G0 who presents for follow up of LLQ pain which as been present for the past 4 months. Pain is intermittent, sharp, stabbing, and lasts a few hours at a time. Pain intensifies before and is slightly improved by BMs, but it is also worse around the time of menses. Occurs sometimes during and after intercourse. Happens sometimes spontaneously. No associated symptoms per patient. GI work up with colonoscopy showed 4 benign polyps (which were removed) but no likely source of pain. Patient has been off of OCPs since 11/2017. Her mother is with her today and reports that she has a h/o constipation since childhood which always caused some left sided discomfort, which she believes was better when patient was on OCPs. Ms. Garduno is currently sexually active with a single male partner.Patient has regular monthly menses. Patient's last menstrual period was 07/06/2019 (approximate).    ROS:     GENERAL: Denies fevers or chills. Feeling well overall.   ABDOMEN: Denies abdominal pain, constipation, diarrhea, nausea, vomiting, change in appetite.   URINARY: Denies frequency, dysuria, hematuria.  NEUROLOGIC: Denies syncope or weakness.     Physical Exam:      PHYSICAL EXAM:  /74   Ht 5' 9" (1.753 m)   Wt 61.7 kg (136 lb 0.4 oz)   LMP 07/06/2019 (Approximate)   BMI 20.09 kg/m²   Body mass index is 20.09 kg/m².     APPEARANCE: Well nourished, well developed, in no acute distress.    Results:      US Pelvis Comp with Transvag NON-OB (7/17/19)    FINDINGS:  Uterus:    Size: 6.0 x 3.1 x 4.0 cm    Appearance: No masses.    Endometrium: Not thickened in this pre menopausal patient, measuring 7 mm.  Small volume of fluid in the endocervical canal.    Right ovary:    Size: 2.6 x 1.6 x 3.0 cm    Appearance: Ovary appears within normal limits.  Simple appearing paraovarian cyst measuring up to 2.2 cm, slightly larger than the prior.    Vascular flow: " Normal.    Left ovary:    Size: 3.0 x 2.3 x 3.1 cm    Appearance: Within normal limits    Vascular Flow: Normal.    Free Fluid:    None.  Impression: Uterus is unremarkable.    Small volume of fluid in the endocervical canal, may be physiologic but for clinical correlation.    Simple appearing right adnexal/paraovarian cyst.  This measures 2.2 cm on today's study, slightly larger than the prior study of 02/06/2017 (1.6 cm).    Ovaries otherwise unremarkable.      Assessment/Plan:     LLQ pain  - Discussed trial of Or[place] to see if pain is improved  - Discussed possible diagnostic laparoscopy to search for/confirm source of pain.  - Discussed avoidance of constipation to improve symptoms.  - Urine culture and microscopy to assess for any possible signs that there is a  contribution to symptoms.    Counseling:       Use of the [employer] [facility] Patient Portal discussed and encouraged during today's visit.

## 2019-08-01 ENCOUNTER — TELEPHONE (OUTPATIENT)
Dept: OBSTETRICS AND GYNECOLOGY | Facility: CLINIC | Age: 29
End: 2019-08-01

## 2019-08-01 NOTE — TELEPHONE ENCOUNTER
Spoke with patient. Informed her that the preliminary result was in but some things were still pending and that someone would contact her when the final results were in and Dr Bennett reviews them. Patient verbalized understanding.

## 2019-08-02 ENCOUNTER — PATIENT MESSAGE (OUTPATIENT)
Dept: OBSTETRICS AND GYNECOLOGY | Facility: CLINIC | Age: 29
End: 2019-08-02

## 2019-08-02 LAB — BACTERIA UR CULT: ABNORMAL

## 2019-08-02 RX ORDER — NITROFURANTOIN 25; 75 MG/1; MG/1
100 CAPSULE ORAL 2 TIMES DAILY
Qty: 14 CAPSULE | Refills: 0 | Status: SHIPPED | OUTPATIENT
Start: 2019-08-02 | End: 2019-08-09

## 2019-08-21 ENCOUNTER — PATIENT MESSAGE (OUTPATIENT)
Dept: OBSTETRICS AND GYNECOLOGY | Facility: CLINIC | Age: 29
End: 2019-08-21

## 2019-09-09 ENCOUNTER — PATIENT MESSAGE (OUTPATIENT)
Dept: OBSTETRICS AND GYNECOLOGY | Facility: CLINIC | Age: 29
End: 2019-09-09

## 2019-09-11 DIAGNOSIS — Z91.89 AT RISK OF UTI (URINARY TRACT INFECTION): ICD-10-CM

## 2019-09-11 DIAGNOSIS — Z13.9 ENCOUNTER FOR MEDICAL SCREENING EXAMINATION: Primary | ICD-10-CM

## 2019-09-13 ENCOUNTER — LAB VISIT (OUTPATIENT)
Dept: LAB | Facility: HOSPITAL | Age: 29
End: 2019-09-13
Attending: OBSTETRICS & GYNECOLOGY
Payer: COMMERCIAL

## 2019-09-13 DIAGNOSIS — Z13.9 ENCOUNTER FOR MEDICAL SCREENING EXAMINATION: ICD-10-CM

## 2019-09-13 LAB
ALBUMIN SERPL BCP-MCNC: 4.1 G/DL (ref 3.5–5.2)
ALP SERPL-CCNC: 63 U/L (ref 55–135)
ALT SERPL W/O P-5'-P-CCNC: 12 U/L (ref 10–44)
ANION GAP SERPL CALC-SCNC: 6 MMOL/L (ref 8–16)
AST SERPL-CCNC: 17 U/L (ref 10–40)
BASOPHILS # BLD AUTO: 0.03 K/UL (ref 0–0.2)
BASOPHILS NFR BLD: 0.5 % (ref 0–1.9)
BILIRUB SERPL-MCNC: 0.6 MG/DL (ref 0.1–1)
BUN SERPL-MCNC: 11 MG/DL (ref 6–20)
CALCIUM SERPL-MCNC: 9.4 MG/DL (ref 8.7–10.5)
CHLORIDE SERPL-SCNC: 107 MMOL/L (ref 95–110)
CHOLEST SERPL-MCNC: 140 MG/DL (ref 120–199)
CHOLEST/HDLC SERPL: 2.3 {RATIO} (ref 2–5)
CO2 SERPL-SCNC: 25 MMOL/L (ref 23–29)
CREAT SERPL-MCNC: 0.9 MG/DL (ref 0.5–1.4)
DIFFERENTIAL METHOD: ABNORMAL
EOSINOPHIL # BLD AUTO: 0.1 K/UL (ref 0–0.5)
EOSINOPHIL NFR BLD: 1.3 % (ref 0–8)
ERYTHROCYTE [DISTWIDTH] IN BLOOD BY AUTOMATED COUNT: 11.7 % (ref 11.5–14.5)
EST. GFR  (AFRICAN AMERICAN): >60 ML/MIN/1.73 M^2
EST. GFR  (NON AFRICAN AMERICAN): >60 ML/MIN/1.73 M^2
GLUCOSE SERPL-MCNC: 89 MG/DL (ref 70–110)
HCT VFR BLD AUTO: 41.8 % (ref 37–48.5)
HDLC SERPL-MCNC: 60 MG/DL (ref 40–75)
HDLC SERPL: 42.9 % (ref 20–50)
HGB BLD-MCNC: 13.3 G/DL (ref 12–16)
IMM GRANULOCYTES # BLD AUTO: 0.01 K/UL (ref 0–0.04)
IMM GRANULOCYTES NFR BLD AUTO: 0.2 % (ref 0–0.5)
LDLC SERPL CALC-MCNC: 72.8 MG/DL (ref 63–159)
LYMPHOCYTES # BLD AUTO: 2.4 K/UL (ref 1–4.8)
LYMPHOCYTES NFR BLD: 37.1 % (ref 18–48)
MCH RBC QN AUTO: 29.5 PG (ref 27–31)
MCHC RBC AUTO-ENTMCNC: 31.8 G/DL (ref 32–36)
MCV RBC AUTO: 93 FL (ref 82–98)
MONOCYTES # BLD AUTO: 0.5 K/UL (ref 0.3–1)
MONOCYTES NFR BLD: 7.7 % (ref 4–15)
NEUTROPHILS # BLD AUTO: 3.4 K/UL (ref 1.8–7.7)
NEUTROPHILS NFR BLD: 53.2 % (ref 38–73)
NONHDLC SERPL-MCNC: 80 MG/DL
NRBC BLD-RTO: 0 /100 WBC
PLATELET # BLD AUTO: 234 K/UL (ref 150–350)
PMV BLD AUTO: 11.4 FL (ref 9.2–12.9)
POTASSIUM SERPL-SCNC: 4.6 MMOL/L (ref 3.5–5.1)
PROT SERPL-MCNC: 6.7 G/DL (ref 6–8.4)
RBC # BLD AUTO: 4.51 M/UL (ref 4–5.4)
SODIUM SERPL-SCNC: 138 MMOL/L (ref 136–145)
TRIGL SERPL-MCNC: 36 MG/DL (ref 30–150)
TSH SERPL DL<=0.005 MIU/L-ACNC: 0.88 UIU/ML (ref 0.4–4)
WBC # BLD AUTO: 6.33 K/UL (ref 3.9–12.7)

## 2019-09-13 PROCEDURE — 80061 LIPID PANEL: CPT

## 2019-09-13 PROCEDURE — 85025 COMPLETE CBC W/AUTO DIFF WBC: CPT

## 2019-09-13 PROCEDURE — 84443 ASSAY THYROID STIM HORMONE: CPT

## 2019-09-13 PROCEDURE — 80053 COMPREHEN METABOLIC PANEL: CPT

## 2019-09-29 ENCOUNTER — PATIENT MESSAGE (OUTPATIENT)
Dept: OBSTETRICS AND GYNECOLOGY | Facility: CLINIC | Age: 29
End: 2019-09-29

## 2019-09-29 DIAGNOSIS — N94.6 DYSMENORRHEA: ICD-10-CM

## 2019-09-29 DIAGNOSIS — N80.9 ENDOMETRIOSIS: Primary | ICD-10-CM

## 2019-09-30 RX ORDER — NORETHINDRONE ACETATE AND ETHINYL ESTRADIOL .02; 1 MG/1; MG/1
1 TABLET ORAL DAILY
Qty: 30 TABLET | Refills: 6 | Status: SHIPPED | OUTPATIENT
Start: 2019-09-30 | End: 2020-04-24

## 2019-11-19 ENCOUNTER — PATIENT MESSAGE (OUTPATIENT)
Dept: OBSTETRICS AND GYNECOLOGY | Facility: CLINIC | Age: 29
End: 2019-11-19

## 2019-11-20 NOTE — TELEPHONE ENCOUNTER
Pt said whatever is easy writing her back on the portal or calling her. Pt was checking on the status of her message from yesterday. Thanks

## 2019-12-09 ENCOUNTER — PATIENT MESSAGE (OUTPATIENT)
Dept: OBSTETRICS AND GYNECOLOGY | Facility: CLINIC | Age: 29
End: 2019-12-09

## 2019-12-09 DIAGNOSIS — N80.9 ENDOMETRIOSIS: Primary | ICD-10-CM

## 2019-12-09 RX ORDER — LEVONORGESTREL AND ETHINYL ESTRADIOL 0.1-0.02MG
1 KIT ORAL DAILY
Qty: 28 TABLET | Refills: 11 | Status: SHIPPED | OUTPATIENT
Start: 2019-12-09 | End: 2020-04-24

## 2020-01-09 ENCOUNTER — PATIENT MESSAGE (OUTPATIENT)
Dept: OBSTETRICS AND GYNECOLOGY | Facility: CLINIC | Age: 30
End: 2020-01-09

## 2020-01-10 NOTE — TELEPHONE ENCOUNTER
Spoke with pt and she is taking the new ocp which she was instructed to take continuous but she is confused because she is 4 1/2 weeks into it and she started spotting.  So she stopped the pills to have a period.     Is she suppose to take continuous or stop to have a period when she starts spotting?

## 2020-01-24 ENCOUNTER — PATIENT MESSAGE (OUTPATIENT)
Dept: OBSTETRICS AND GYNECOLOGY | Facility: CLINIC | Age: 30
End: 2020-01-24

## 2020-03-17 ENCOUNTER — PATIENT MESSAGE (OUTPATIENT)
Dept: OBSTETRICS AND GYNECOLOGY | Facility: CLINIC | Age: 30
End: 2020-03-17

## 2020-04-12 ENCOUNTER — PATIENT MESSAGE (OUTPATIENT)
Dept: OBSTETRICS AND GYNECOLOGY | Facility: CLINIC | Age: 30
End: 2020-04-12

## 2020-04-13 ENCOUNTER — PATIENT MESSAGE (OUTPATIENT)
Dept: OBSTETRICS AND GYNECOLOGY | Facility: CLINIC | Age: 30
End: 2020-04-13

## 2020-04-24 ENCOUNTER — OFFICE VISIT (OUTPATIENT)
Dept: OBSTETRICS AND GYNECOLOGY | Facility: CLINIC | Age: 30
End: 2020-04-24
Payer: COMMERCIAL

## 2020-04-24 ENCOUNTER — TELEPHONE (OUTPATIENT)
Dept: OBSTETRICS AND GYNECOLOGY | Facility: CLINIC | Age: 30
End: 2020-04-24

## 2020-04-24 VITALS
DIASTOLIC BLOOD PRESSURE: 62 MMHG | WEIGHT: 134.81 LBS | BODY MASS INDEX: 19.97 KG/M2 | SYSTOLIC BLOOD PRESSURE: 112 MMHG | HEIGHT: 69 IN

## 2020-04-24 DIAGNOSIS — R30.0 DYSURIA: ICD-10-CM

## 2020-04-24 DIAGNOSIS — R10.2 PELVIC PAIN: ICD-10-CM

## 2020-04-24 DIAGNOSIS — R10.2 PELVIC PAIN: Primary | ICD-10-CM

## 2020-04-24 DIAGNOSIS — N80.9 ENDOMETRIOSIS: Primary | ICD-10-CM

## 2020-04-24 PROCEDURE — 87086 URINE CULTURE/COLONY COUNT: CPT

## 2020-04-24 PROCEDURE — 99999 PR PBB SHADOW E&M-EST. PATIENT-LVL III: CPT | Mod: PBBFAC,,, | Performed by: OBSTETRICS & GYNECOLOGY

## 2020-04-24 PROCEDURE — 99999 PR PBB SHADOW E&M-EST. PATIENT-LVL III: ICD-10-PCS | Mod: PBBFAC,,, | Performed by: OBSTETRICS & GYNECOLOGY

## 2020-04-24 PROCEDURE — 99214 PR OFFICE/OUTPT VISIT, EST, LEVL IV, 30-39 MIN: ICD-10-PCS | Mod: S$GLB,,, | Performed by: OBSTETRICS & GYNECOLOGY

## 2020-04-24 PROCEDURE — 99214 OFFICE O/P EST MOD 30 MIN: CPT | Mod: S$GLB,,, | Performed by: OBSTETRICS & GYNECOLOGY

## 2020-04-24 PROCEDURE — 3008F PR BODY MASS INDEX (BMI) DOCUMENTED: ICD-10-PCS | Mod: CPTII,S$GLB,, | Performed by: OBSTETRICS & GYNECOLOGY

## 2020-04-24 PROCEDURE — 3008F BODY MASS INDEX DOCD: CPT | Mod: CPTII,S$GLB,, | Performed by: OBSTETRICS & GYNECOLOGY

## 2020-04-24 RX ORDER — OXYCODONE AND ACETAMINOPHEN 5; 325 MG/1; MG/1
1 TABLET ORAL EVERY 4 HOURS PRN
Qty: 20 TABLET | Refills: 0 | Status: SHIPPED | OUTPATIENT
Start: 2020-04-24 | End: 2021-10-29

## 2020-04-24 RX ORDER — FLUTICASONE PROPIONATE 50 MCG
SPRAY, SUSPENSION (ML) NASAL
COMMUNITY
End: 2021-01-02 | Stop reason: SDUPTHER

## 2020-04-24 NOTE — PROGRESS NOTES
"  Chief Complaint: Pelvic Pain, Dysmenorrhea     HPI:      Ainsley Garduno is a 30 y.o.  who presents complaining of ongoing pelvic pain. She has a presumed dx of endometriosis as pain symptoms have been cyclic in nature and are controlled while on OCPs.  Ms. Garduno is currently sexually active with a single male partner. She is currently using no method for contraception as she and her partner are trying to conceive. Patient has regular monthly menses. Patient's last menstrual period was 2020.    Pain significantly increased yesterday. Was having pain with urination today. Has happened once before but was during menses then too.   BM yesterday very painful. Soemtimes pain with BMs in between periods but better now that she's taking metamucil regularly. Pain with BMs has been an ongoing problem - had colonoscopy in 2019. Pt has been alternating tylenol and motrin for pain.    ROS:     GENERAL: Denies fevers or chills. Feeling well overall.   ABDOMEN: Denies abdominal pain, constipation, diarrhea, nausea, vomiting, change in appetite.   URINARY: Denies frequency, dysuria, hematuria.  NEUROLOGIC: Denies syncope or weakness.     Physical Exam:      PHYSICAL EXAM:  /62   Ht 5' 9" (1.753 m)   Wt 61.1 kg (134 lb 13 oz)   LMP 2020   BMI 19.91 kg/m²   Body mass index is 19.91 kg/m².     APPEARANCE: Well nourished, well developed, in no acute distress.        Results:      US Pelvis Comp with Transvag NON-OB (xpd  Narrative: EXAMINATION:  US PELVIS COMP WITH TRANSVAG NON-OB (XPD)    CLINICAL HISTORY:  Pelvic and perineal pain    TECHNIQUE:  Transabdominal sonography of the pelvis was performed, followed by transvaginal sonography to better evaluate the uterus and ovaries.    COMPARISON:  2017    FINDINGS:  Uterus:    Size: 6.0 x 3.1 x 4.0 cm    Appearance: No masses.    Endometrium: Not thickened in this pre menopausal patient, measuring 7 mm.  Small volume of fluid in the endocervical " canal.    Right ovary:    Size: 2.6 x 1.6 x 3.0 cm    Appearance: Ovary appears within normal limits.  Simple appearing paraovarian cyst measuring up to 2.2 cm, slightly larger than the prior.    Vascular flow: Normal.    Left ovary:    Size: 3.0 x 2.3 x 3.1 cm    Appearance: Within normal limits    Vascular Flow: Normal.    Free Fluid:    None.  Impression: Uterus is unremarkable.    Small volume of fluid in the endocervical canal, may be physiologic but for clinical correlation.    Simple appearing right adnexal/paraovarian cyst.  This measures 2.2 cm on today's study, slightly larger than the prior study of 02/06/2017 (1.6 cm).    Ovaries otherwise unremarkable.    Electronically signed by: Edil Laguna MD  Date:    07/17/2019  Time:    10:44        Assessment/Plan:     Endometriosis  -     oxyCODONE-acetaminophen (PERCOCET) 5-325 mg per tablet; Take 1 tablet by mouth every 4 (four) hours as needed for Pain.  Dispense: 20 tablet; Refill: 0    Dysuria  -     Urine culture    Pelvic pain  -     oxyCODONE-acetaminophen (PERCOCET) 5-325 mg per tablet; Take 1 tablet by mouth every 4 (four) hours as needed for Pain.  Dispense: 20 tablet; Refill: 0    Discussed with patient options moving forward, and we both feel that diagnostic laparoscopy is the best next step with chromopertubation to evaluate for tubal patency.     Counseling:       Use of the [employer] Patient Portal discussed and encouraged during today's visit.

## 2020-04-24 NOTE — TELEPHONE ENCOUNTER
"Called pt and she started cycle on 4/21 and is in excruciating pain.  She states her pain is 9/10.  Informed pt she may want to go to the ER for pain control because an appt at our office wouldn't be able to provide pain control.  She said " I'm not worried about the pain I want to see if anything is visualized on u/s".    I spoke with Dr. Bennett and advised that she will have our surgery scheduler push up her surgery.  Pt has endo and that will not be visualized on u/s but an endometrioma may.     She will speak to her  and call back if she wants appt with u/s today.    Order placed.   "

## 2020-04-26 LAB — BACTERIA UR CULT: NORMAL

## 2020-05-08 ENCOUNTER — TELEPHONE (OUTPATIENT)
Dept: OBSTETRICS AND GYNECOLOGY | Facility: CLINIC | Age: 30
End: 2020-05-08

## 2020-05-08 DIAGNOSIS — R10.2 PELVIC PAIN IN FEMALE: Primary | ICD-10-CM

## 2020-05-08 DIAGNOSIS — Z01.818 PRE-OP EVALUATION: ICD-10-CM

## 2020-05-08 DIAGNOSIS — R10.2 ACUTE PELVIC PAIN, FEMALE: Primary | ICD-10-CM

## 2020-05-11 ENCOUNTER — OFFICE VISIT (OUTPATIENT)
Dept: OBSTETRICS AND GYNECOLOGY | Facility: CLINIC | Age: 30
End: 2020-05-11
Payer: COMMERCIAL

## 2020-05-11 DIAGNOSIS — N80.9 ENDOMETRIOSIS: ICD-10-CM

## 2020-05-11 DIAGNOSIS — R10.2 PELVIC PAIN IN FEMALE: Primary | ICD-10-CM

## 2020-05-11 PROCEDURE — 99213 OFFICE O/P EST LOW 20 MIN: CPT | Mod: 95,,, | Performed by: OBSTETRICS & GYNECOLOGY

## 2020-05-11 PROCEDURE — 99213 PR OFFICE/OUTPT VISIT, EST, LEVL III, 20-29 MIN: ICD-10-PCS | Mod: 95,,, | Performed by: OBSTETRICS & GYNECOLOGY

## 2020-05-11 NOTE — PROGRESS NOTES
The patient location is: Home   The chief complaint leading to consultation is: Pre-Op Visit  Visit type: audiovisual  Total time spent with patient: 45 minutes  Each patient to whom he or she provides medical services by telemedicine is:  (1) informed of the relationship between the physician and patient and the respective role of any other health care provider with respect to management of the patient; and (2) notified that he or she may decline to receive medical services by telemedicine and may withdraw from such care at any time.      Chief Complaint:  Pre-Operative Visit for Diagnostic Laparoscopy, Chromopertubation    History of Present Illness    Ainsley Garduno is a 30 y.o.  here for preop visit.     She has a presumed dx of endometriosis as pain symptoms have been cyclic in nature and are controlled while on OCPs.  Ms. Garduno is currently sexually active with a single male partner. She is currently using no method for contraception as she and her partner are trying to conceive. Patient has regular monthly menses. Patient's last menstrual period was 2020. Pain has been increasing over the past few cycles.     Pre-operative optimization was not needed.    Patient's last menstrual period was 2020. She is currently using no method for contraception.    Past Medical History:   Diagnosis Date    Abnormal Pap smear of cervix     Hpv +        (Ascus/ Hpv neg on 8-10-16)    History of HPV infection     Pap smear for cervical cancer screening 08/10/2016    ASCUS/ Hpv Negative  (Previous Hpv + Paps, Repeat paps Q 6 months) (DUE REPEAT PAP 2017)       Past Surgical History:   Procedure Laterality Date    COLPOSCOPY      TONSILLECTOMY  2002       Family History   Problem Relation Age of Onset    Breast cancer Paternal Aunt     Prostate cancer Maternal Grandfather     Ovarian cancer Neg Hx     Colon cancer Neg Hx        Social History     Tobacco Use   Smoking Status  Never Smoker   Smokeless Tobacco Never Used       Social History     Substance and Sexual Activity   Sexual Activity Yes    Partners: Male    Birth control/protection: Condom    Comment: Single:  In a relationship        OB History    Para Term  AB Living   0 0 0 0 0 0   SAB TAB Ectopic Multiple Live Births   0 0 0 0         Review of Systems  GENERAL: Denies unintentional weight gain or weight loss. Feeling well overall.   SKIN: Denies rash or lesions.   HEENT: Denies headaches, or vision changes.   CARDIOVASCULAR: Denies palpitations or chest pain.   RESPIRATORY: Denies shortness of breath or dyspnea on exertion.  BREASTS: Denies pain, lumps, or nipple discharge.   ABDOMEN: Denies abdominal pain, constipation, diarrhea, nausea, vomiting, change in appetite.  URINARY: Denies frequency, dysuria, hematuria.  NEUROLOGIC: Denies syncope or weakness.   PSYCHIATRIC: Denies depression, anxiety or mood swings.     Objective:     LMP 2020     There is no height or weight on file to calculate BMI.    APPEARANCE: Well nourished, well developed, in no acute distress.  PSYCH: Appropriate mood and affect.  SKIN: No acne or hirsutism  CHEST: Normal respiratory effort  PELVIC: From exam on 5/10/2019: Normal external genitalia without lesions.  Normal hair distribution.  Adequate perineal body, normal urethral meatus.  Vagina moist and well rugated without lesions or discharge.  Cervix pink, without lesions, discharge or tenderness.  No significant cystocele or rectocele.  Bimanual exam shows uterus to be normal size, regular, mobile and nontender.  Adnexa without masses or tenderness.   EXTREMITIES: Moves all extremities evenly.       Last Pap: NILM 8/15/2018    Ultrasound:   Results for orders placed in visit on 20   US Pelvis Comp with Transvag NON-OB (xpd    Narrative EXAMINATION:  US PELVIS COMP WITH TRANSVAG NON-OB (XPD)    CLINICAL HISTORY:  Pelvic and perineal pain    TECHNIQUE:  Transabdominal  sonography of the pelvis was performed, followed by transvaginal sonography to better evaluate the uterus and ovaries.    COMPARISON:  Pelvic ultrasound dated 07/17/2019    FINDINGS:  Uterus:    Size: 6.3 x 3.5 x 4.8 cm    Masses: None    Endometrium: Normal in this pre menopausal patient, measuring 3.4 mm.    Right ovary:    Size: 2.7 x 1.8 x 2.7 cm    Appearance: Simple appearing paraovarian cyst measuring 2.2 x 2.3 x 1.9 cm, not significantly changed.    Vascular flow: Present    Left ovary:    Size: 2.7 x 1.6 x 3.2 cm cm    Appearance: Normal    Vascular Flow: Present    Free Fluid:    None.      Impression Uterus is unremarkable.    Simple appearing right adnexal/paraovarian cyst, not significantly changed.      Electronically signed by: Cj Matson  Date:    04/29/2020  Time:    16:01     Assessment/ Plan:     1. Pelvic pain in female     2. Endometriosis         1. To OR for diagnostic laparoscopy and chromopertubation on 5/19/20  2. Consent's signed      Counseling     With the patient I had a full discussion of the risks, benefits, and alternatives of all procedures to be performed, including but not limited to injury to other organs, failure to resolve problem, recurrence of disease state, and infection. We discussed the risks, benefits, and alternatives to blood transfusion as well.   Ms. Garduno was counseled that because of her underlying endometriosis with bowel symptoms certain risks of the procedure such as bowel injury or need for further procedures may be increased. All of 's questions were answered, and she voiced understanding. She agrees with the plan of care; therefore, we will proceed with the surgery as scheduled.

## 2020-05-11 NOTE — H&P
Chief Complaint:  Pre-Operative Visit for Diagnostic Laparoscopy, Chromopertubation    History of Present Illness    Ainsley Garduno is a 30 y.o.  here for preop visit.     She has a presumed dx of endometriosis as pain symptoms have been cyclic in nature and are controlled while on OCPs.  Ms. Garduno is currently sexually active with a single male partner. She is currently using no method for contraception as she and her partner are trying to conceive. Patient has regular monthly menses. Patient's last menstrual period was 2020. Pain has been increasing over the past few cycles.     Pre-operative optimization was not needed.    Patient's last menstrual period was 2020. She is currently using no method for contraception.    Past Medical History:   Diagnosis Date    Abnormal Pap smear of cervix     Hpv +        (Ascus/ Hpv neg on 8-10-16)    History of HPV infection     Pap smear for cervical cancer screening 08/10/2016    ASCUS/ Hpv Negative  (Previous Hpv + Paps, Repeat paps Q 6 months) (DUE REPEAT PAP 2017)       Past Surgical History:   Procedure Laterality Date    COLPOSCOPY      TONSILLECTOMY         Family History   Problem Relation Age of Onset    Breast cancer Paternal Aunt     Prostate cancer Maternal Grandfather     Ovarian cancer Neg Hx     Colon cancer Neg Hx        Social History     Tobacco Use   Smoking Status Never Smoker   Smokeless Tobacco Never Used       Social History     Substance and Sexual Activity   Sexual Activity Yes    Partners: Male    Birth control/protection: Condom    Comment: Single:  In a relationship        OB History    Para Term  AB Living   0 0 0 0 0 0   SAB TAB Ectopic Multiple Live Births   0 0 0 0         Review of Systems  GENERAL: Denies unintentional weight gain or weight loss. Feeling well overall.   SKIN: Denies rash or lesions.   HEENT: Denies headaches, or vision changes.   CARDIOVASCULAR: Denies  palpitations or chest pain.   RESPIRATORY: Denies shortness of breath or dyspnea on exertion.  BREASTS: Denies pain, lumps, or nipple discharge.   ABDOMEN: Denies abdominal pain, constipation, diarrhea, nausea, vomiting, change in appetite.  URINARY: Denies frequency, dysuria, hematuria.  NEUROLOGIC: Denies syncope or weakness.   PSYCHIATRIC: Denies depression, anxiety or mood swings.     Objective:     LMP 04/24/2020     There is no height or weight on file to calculate BMI.    APPEARANCE: Well nourished, well developed, in no acute distress.  PSYCH: Appropriate mood and affect.  SKIN: No acne or hirsutism  CHEST: Normal respiratory effort  PELVIC: From exam on 5/10/2019: Normal external genitalia without lesions.  Normal hair distribution.  Adequate perineal body, normal urethral meatus.  Vagina moist and well rugated without lesions or discharge.  Cervix pink, without lesions, discharge or tenderness.  No significant cystocele or rectocele.  Bimanual exam shows uterus to be normal size, regular, mobile and nontender.  Adnexa without masses or tenderness.   EXTREMITIES: Moves all extremities evenly.       Last Pap: NILM 8/15/2018    Ultrasound:   Results for orders placed in visit on 04/24/20   US Pelvis Comp with Transvag NON-OB (xpd    Narrative EXAMINATION:  US PELVIS COMP WITH TRANSVAG NON-OB (XPD)    CLINICAL HISTORY:  Pelvic and perineal pain    TECHNIQUE:  Transabdominal sonography of the pelvis was performed, followed by transvaginal sonography to better evaluate the uterus and ovaries.    COMPARISON:  Pelvic ultrasound dated 07/17/2019    FINDINGS:  Uterus:    Size: 6.3 x 3.5 x 4.8 cm    Masses: None    Endometrium: Normal in this pre menopausal patient, measuring 3.4 mm.    Right ovary:    Size: 2.7 x 1.8 x 2.7 cm    Appearance: Simple appearing paraovarian cyst measuring 2.2 x 2.3 x 1.9 cm, not significantly changed.    Vascular flow: Present    Left ovary:    Size: 2.7 x 1.6 x 3.2 cm cm    Appearance:  Normal    Vascular Flow: Present    Free Fluid:    None.      Impression Uterus is unremarkable.    Simple appearing right adnexal/paraovarian cyst, not significantly changed.      Electronically signed by: Cj Matson  Date:    04/29/2020  Time:    16:01     Assessment/ Plan:     1. Pelvic pain in female     2. Endometriosis         1. To OR for diagnostic laparoscopy and chromopertubation on 5/19/20  2. Consent's signed      Counseling     With the patient I had a full discussion of the risks, benefits, and alternatives of all procedures to be performed, including but not limited to injury to other organs, failure to resolve problem, recurrence of disease state, and infection. We discussed the risks, benefits, and alternatives to blood transfusion as well.   Ms. Garduno was counseled that because of her underlying endometriosis with bowel symptoms certain risks of the procedure such as bowel injury or need for further procedures may be increased. All of 's questions were answered, and she voiced understanding. She agrees with the plan of care; therefore, we will proceed with the surgery as scheduled.

## 2020-05-12 ENCOUNTER — PATIENT MESSAGE (OUTPATIENT)
Dept: SURGERY | Facility: OTHER | Age: 30
End: 2020-05-12

## 2020-05-15 ENCOUNTER — ANESTHESIA EVENT (OUTPATIENT)
Dept: SURGERY | Facility: OTHER | Age: 30
End: 2020-05-15
Payer: COMMERCIAL

## 2020-05-15 ENCOUNTER — HOSPITAL ENCOUNTER (OUTPATIENT)
Dept: PREADMISSION TESTING | Facility: OTHER | Age: 30
Discharge: HOME | End: 2020-05-15
Attending: OBSTETRICS & GYNECOLOGY
Payer: COMMERCIAL

## 2020-05-15 VITALS
HEIGHT: 69 IN | DIASTOLIC BLOOD PRESSURE: 58 MMHG | WEIGHT: 130 LBS | HEART RATE: 68 BPM | TEMPERATURE: 98 F | BODY MASS INDEX: 19.26 KG/M2 | OXYGEN SATURATION: 99 % | RESPIRATION RATE: 16 BRPM | SYSTOLIC BLOOD PRESSURE: 117 MMHG

## 2020-05-15 DIAGNOSIS — Z01.818 PREOP TESTING: Primary | ICD-10-CM

## 2020-05-15 DIAGNOSIS — R10.2 ACUTE PELVIC PAIN, FEMALE: ICD-10-CM

## 2020-05-15 LAB
ANION GAP SERPL CALC-SCNC: 8 MMOL/L (ref 8–16)
BASOPHILS # BLD AUTO: 0.03 K/UL (ref 0–0.2)
BASOPHILS NFR BLD: 0.4 % (ref 0–1.9)
BUN SERPL-MCNC: 15 MG/DL (ref 6–20)
CALCIUM SERPL-MCNC: 9 MG/DL (ref 8.7–10.5)
CHLORIDE SERPL-SCNC: 107 MMOL/L (ref 95–110)
CO2 SERPL-SCNC: 23 MMOL/L (ref 23–29)
CREAT SERPL-MCNC: 0.8 MG/DL (ref 0.5–1.4)
DIFFERENTIAL METHOD: NORMAL
EOSINOPHIL # BLD AUTO: 0.1 K/UL (ref 0–0.5)
EOSINOPHIL NFR BLD: 1.3 % (ref 0–8)
ERYTHROCYTE [DISTWIDTH] IN BLOOD BY AUTOMATED COUNT: 11.6 % (ref 11.5–14.5)
EST. GFR  (AFRICAN AMERICAN): >60 ML/MIN/1.73 M^2
EST. GFR  (NON AFRICAN AMERICAN): >60 ML/MIN/1.73 M^2
GLUCOSE SERPL-MCNC: 87 MG/DL (ref 70–110)
HCT VFR BLD AUTO: 38.9 % (ref 37–48.5)
HGB BLD-MCNC: 13 G/DL (ref 12–16)
IMM GRANULOCYTES # BLD AUTO: 0.01 K/UL (ref 0–0.04)
IMM GRANULOCYTES NFR BLD AUTO: 0.1 % (ref 0–0.5)
LYMPHOCYTES # BLD AUTO: 2.9 K/UL (ref 1–4.8)
LYMPHOCYTES NFR BLD: 35.2 % (ref 18–48)
MCH RBC QN AUTO: 30.2 PG (ref 27–31)
MCHC RBC AUTO-ENTMCNC: 33.4 G/DL (ref 32–36)
MCV RBC AUTO: 90 FL (ref 82–98)
MONOCYTES # BLD AUTO: 0.7 K/UL (ref 0.3–1)
MONOCYTES NFR BLD: 8.9 % (ref 4–15)
NEUTROPHILS # BLD AUTO: 4.5 K/UL (ref 1.8–7.7)
NEUTROPHILS NFR BLD: 54.1 % (ref 38–73)
NRBC BLD-RTO: 0 /100 WBC
PLATELET # BLD AUTO: 197 K/UL (ref 150–350)
PMV BLD AUTO: 10.8 FL (ref 9.2–12.9)
POTASSIUM SERPL-SCNC: 4.1 MMOL/L (ref 3.5–5.1)
RBC # BLD AUTO: 4.31 M/UL (ref 4–5.4)
SODIUM SERPL-SCNC: 138 MMOL/L (ref 136–145)
TSH SERPL DL<=0.005 MIU/L-ACNC: 0.49 UIU/ML (ref 0.4–4)
WBC # BLD AUTO: 8.23 K/UL (ref 3.9–12.7)

## 2020-05-15 PROCEDURE — 93010 ELECTROCARDIOGRAM REPORT: CPT | Mod: ,,, | Performed by: INTERNAL MEDICINE

## 2020-05-15 PROCEDURE — 36415 COLL VENOUS BLD VENIPUNCTURE: CPT

## 2020-05-15 PROCEDURE — 93010 EKG 12-LEAD: ICD-10-PCS | Mod: ,,, | Performed by: INTERNAL MEDICINE

## 2020-05-15 PROCEDURE — 80048 BASIC METABOLIC PNL TOTAL CA: CPT

## 2020-05-15 PROCEDURE — 93005 ELECTROCARDIOGRAM TRACING: CPT

## 2020-05-15 PROCEDURE — 84443 ASSAY THYROID STIM HORMONE: CPT

## 2020-05-15 PROCEDURE — 85025 COMPLETE CBC W/AUTO DIFF WBC: CPT

## 2020-05-15 RX ORDER — CETIRIZINE HYDROCHLORIDE 10 MG/1
10 TABLET ORAL DAILY
COMMUNITY

## 2020-05-15 RX ORDER — OMEPRAZOLE 10 MG/1
10 CAPSULE, DELAYED RELEASE ORAL DAILY
COMMUNITY

## 2020-05-15 RX ORDER — LORATADINE 10 MG/1
10 TABLET ORAL DAILY
COMMUNITY
End: 2020-08-04

## 2020-05-15 NOTE — ANESTHESIA PREPROCEDURE EVALUATION
05/15/2020  Ainsley Garduno is a 30 y.o., female.    Anesthesia Evaluation    I have reviewed the Patient Summary Reports.    I have reviewed the Nursing Notes.   I have reviewed the Medications.     Review of Systems  Anesthesia Hx:  Denies Family Hx of Anesthesia complications.   Denies Personal Hx of Anesthesia complications.   Social:  Non-Smoker    Hematology/Oncology:  Hematology Normal   Oncology Normal   Hematology Comments: Last CBC WNL    EENT/Dental:   chronic allergic rhinitis   Cardiovascular:   Dysrhythmias (PSVT)    Pulmonary:  Pulmonary Normal    Renal/:  Renal/ Normal     Hepatic/GI:  Hepatic/GI Normal    Musculoskeletal:  Musculoskeletal Normal    OB/GYN/PEDS:  Percocet given for pelvic pain, pt states has not started taking   Neurological:  Neurology Normal    Endocrine:  Endocrine Normal    Dermatological:  Skin Normal    Psych:  Psychiatric Normal           Physical Exam  General:  Well nourished    Airway/Jaw/Neck:  Airway Findings: Mouth Opening: Small, but > 3cm Mallampati: II  TM Distance: < 4 cm      Dental:  Dental Findings: In tact        Mental Status:  Mental Status Findings:  Cooperative, Alert and Oriented         Anesthesia Plan  Type of Anesthesia, risks & benefits discussed:  Anesthesia Type:  general  Patient's Preference:   Intra-op Monitoring Plan: standard ASA monitors  Intra-op Monitoring Plan Comments:   Post Op Pain Control Plan: per primary service following discharge from PACU and multimodal analgesia  Post Op Pain Control Plan Comments:   Induction:   IV  Beta Blocker:         Informed Consent: Patient understands risks and agrees with Anesthesia plan.  Questions answered. Anesthesia consent signed with patient.  ASA Score: 2     Day of Surgery Review of History & Physical:    H&P update referred to the surgeon.     Anesthesia Plan Notes: Pt is scheduled  "for Holter/Cardiology consult due to daily sx's of "tachycardia" States was worked up years ago, was put on metoprolol, developed a rash and stopped taking it, then was lost to follow up. Need to postpone this elective surgery to complete work up.     Labs ordered today by surgeon.    Addendum: cleared by cardiologist Sapna. See Notes for full report    Day of surgery update: above reviewed, covid neg        Ready For Surgery From Anesthesia Perspective.       "

## 2020-05-15 NOTE — DISCHARGE INSTRUCTIONS
Information to Prepare you for your Surgery    PRE-ADMIT TESTING -  [phone]    [address]  [facility] [facility]          Your surgery has been scheduled at [facility]. We are pleased to have the opportunity to serve you. For Further Information please call [phone].    On the day of surgery please report to the Information Desk on the 1st floor.    · CONTACT YOUR PHYSICIAN'S OFFICE THE DAY PRIOR TO YOUR SURGERY TO OBTAIN YOUR ARRIVAL TIME.     · The evening before surgery do not eat anything after 9 p.m. ( this includes hard candy, chewing gum and mints).  You may only have GATORADE, POWERADE AND WATER  from 9 p.m. until you leave your home.   DO NOT DRINK ANY LIQUIDS ON THE WAY TO THE HOSPITAL.      SPECIAL MEDICATION INSTRUCTIONS: TAKE medications checked off by the Anesthesiologist on your Medication List.    Angiogram Patients: Take medications as instructed by your physician, including aspirin.     Surgery Patients:    If you take ASPIRIN - Your PHYSICIAN/SURGEON will need to inform you IF/OR when you need to stop taking aspirin prior to your surgery.     Do Not take any medications containing IBUPROFEN.  Do Not Wear any make-up or dark nail polish   (especially eye make-up) to surgery. If you come to surgery with makeup on you will be required to remove the makeup or nail polish.    Do not shave your surgical area at least 5 days prior to your surgery. The surgical prep will be performed at the hospital according to Infection Control regulations.    Leave all valuables at home.   Do Not wear any jewelry or watches, including any metal in body piercings. Jewelry must be removed prior to coming to the hospital.  There is a possibility that rings that are unable to be removed may be cut off if they are on the surgical extremity.    Contact Lens must be removed before surgery. Either do not wear the contact lens or bring a case and solution for  storage.  Please bring a container for eyeglasses or dentures as required.  Bring any paperwork your physician has provided, such as consent forms,  history and physicals, doctor's orders, etc.   Bring comfortable clothes that are loose fitting to wear upon discharge. Take into consideration the type of surgery being performed.  Maintain your diet as advised per your physician the day prior to surgery.      Adequate rest the night before surgery is advised.   Park in the Parking lot behind the hospital or in the [facility] across the street from the parking lot. Parking is complimentary.  If you will be discharged the same day as your procedure, please arrange for a responsible adult to drive you home or to accompany you if traveling by taxi.   YOU WILL NOT BE PERMITTED TO DRIVE OR TO LEAVE THE HOSPITAL ALONE AFTER SURGERY.   It is strongly recommended that you arrange for someone to remain with you for the first 24 hrs following your surgery.    The Surgeon will speak to your family/visitor after your surgery regarding the outcome of your surgery and post op care.  The Surgeon may speak to you after your surgery, but there is a possibility you may not remember the details.  Please check with your family members regarding the conversation with the Surgeon.    We strongly recommend whoever is bringing you home be present for discharge instructions.  This will ensure a thorough understanding for your post op home care.    EACH PATIENT IS ALLOWED TWO FAMILY MEMBERS OR VISITORS IN THE ROOM AND IN THE WAITING ROOMS WHILE YOU ARE IN SURGERY. ALL CHILDREN MUST ALWAYS BE ACCOMPANIED BY AN ADULT.    Thank you for your cooperation.  The Staff of [facility].                Bathing Instructions with Hibiclens     Shower the evening before and morning of your procedure with Hibiclens:   Wash your face with water and your regular face wash/soap   Apply Hibiclens directly on your skin or on a  wet washcloth and wash gently. When showering: Move away from the shower stream when applying Hibiclens to avoid rinsing off too soon.   Rinse thoroughly with warm water   Do not dilute Hibiclens         Dry off as usual, do not use any deodorant, powder, body lotions, perfume, after shave or cologne.

## 2020-05-16 ENCOUNTER — LAB VISIT (OUTPATIENT)
Dept: INTERNAL MEDICINE | Facility: CLINIC | Age: 30
End: 2020-05-16
Payer: COMMERCIAL

## 2020-05-16 DIAGNOSIS — R10.2 PELVIC PAIN IN FEMALE: ICD-10-CM

## 2020-05-16 DIAGNOSIS — Z01.818 PRE-OP EVALUATION: ICD-10-CM

## 2020-05-16 PROCEDURE — U0003 INFECTIOUS AGENT DETECTION BY NUCLEIC ACID (DNA OR RNA); SEVERE ACUTE RESPIRATORY SYNDROME CORONAVIRUS 2 (SARS-COV-2) (CORONAVIRUS DISEASE [COVID-19]), AMPLIFIED PROBE TECHNIQUE, MAKING USE OF HIGH THROUGHPUT TECHNOLOGIES AS DESCRIBED BY CMS-2020-01-R: HCPCS

## 2020-05-17 LAB — SARS-COV-2 RNA RESP QL NAA+PROBE: NOT DETECTED

## 2020-05-18 ENCOUNTER — OFFICE VISIT (OUTPATIENT)
Dept: CARDIOLOGY | Facility: CLINIC | Age: 30
End: 2020-05-18
Payer: COMMERCIAL

## 2020-05-18 ENCOUNTER — TELEPHONE (OUTPATIENT)
Dept: OBSTETRICS AND GYNECOLOGY | Facility: CLINIC | Age: 30
End: 2020-05-18

## 2020-05-18 VITALS
WEIGHT: 133.38 LBS | BODY MASS INDEX: 19.7 KG/M2 | DIASTOLIC BLOOD PRESSURE: 82 MMHG | SYSTOLIC BLOOD PRESSURE: 118 MMHG | HEART RATE: 76 BPM

## 2020-05-18 DIAGNOSIS — R00.2 PALPITATIONS: Primary | ICD-10-CM

## 2020-05-18 PROCEDURE — 3008F BODY MASS INDEX DOCD: CPT | Mod: CPTII,S$GLB,, | Performed by: INTERNAL MEDICINE

## 2020-05-18 PROCEDURE — 3008F PR BODY MASS INDEX (BMI) DOCUMENTED: ICD-10-PCS | Mod: CPTII,S$GLB,, | Performed by: INTERNAL MEDICINE

## 2020-05-18 PROCEDURE — 99203 PR OFFICE/OUTPT VISIT, NEW, LEVL III, 30-44 MIN: ICD-10-PCS | Mod: S$GLB,,, | Performed by: INTERNAL MEDICINE

## 2020-05-18 PROCEDURE — 99999 PR PBB SHADOW E&M-EST. PATIENT-LVL II: CPT | Mod: PBBFAC,,, | Performed by: INTERNAL MEDICINE

## 2020-05-18 PROCEDURE — 99203 OFFICE O/P NEW LOW 30 MIN: CPT | Mod: S$GLB,,, | Performed by: INTERNAL MEDICINE

## 2020-05-18 PROCEDURE — 99999 PR PBB SHADOW E&M-EST. PATIENT-LVL II: ICD-10-PCS | Mod: PBBFAC,,, | Performed by: INTERNAL MEDICINE

## 2020-05-18 NOTE — TELEPHONE ENCOUNTER
----- Message from Carlee De Los Santos RN sent at 5/18/2020 12:17 PM CDT -----  Regarding: CLEARANCE  Hi- Clearance was given for Ainsley per Dr Alejo. We Can proceed with surgery. Thank you--Carlee

## 2020-05-18 NOTE — PROGRESS NOTES
Cardiology Consult  5/18/2020    Attending Cardiologist: Jj Alejo M.D.  Primary Care Provider: Puja Loza MD  Chief Complaint/Reason For Consultation:  preop clearance      Problem list  There is no problem list on file for this patient.      CC:  preop clearance    HPI:  Ainsley Garduno is a 30 y.o.year-old female scheduled for lap gyn surgery tomorrow for endometriosis.  She has history of palpitations in past which was evaluated by Dr. Hansel Horvath and found to be sinus tach and responded to metoprolol.  But she had to stop metoprolol due to rash.  She has no CP, SOB, PND, syncope.  She has occasional palpitations.  Active.  Mother has recent balloon valvuloplasty due to MS.     Medications  Current Outpatient Medications   Medication Sig Dispense Refill    cetirizine (ZYRTEC) 10 MG tablet Take 10 mg by mouth once daily.      fluticasone propionate (FLONASE) 50 mcg/actuation nasal spray fluticasone propionate 50 mcg/actuation nasal spray,suspension      loratadine (CLARITIN) 10 mg tablet Take 10 mg by mouth once daily.      omeprazole (PRILOSEC) 10 MG capsule Take 10 mg by mouth once daily.      oxyCODONE-acetaminophen (PERCOCET) 5-325 mg per tablet Take 1 tablet by mouth every 4 (four) hours as needed for Pain. 20 tablet 0    AFLURIA QUAD [id number], PF, 60 mcg/0.5 mL vaccine ADM 0.5ML IM UTD  0     No current facility-administered medications for this visit.       Prior to Admission medications    Medication Sig Start Date End Date Taking? Authorizing Provider   cetirizine (ZYRTEC) 10 MG tablet Take 10 mg by mouth once daily.   Yes Historical Provider, MD   fluticasone propionate (FLONASE) 50 mcg/actuation nasal spray fluticasone propionate 50 mcg/actuation nasal spray,suspension   Yes Historical Provider, MD   loratadine (CLARITIN) 10 mg tablet Take 10 mg by mouth once daily.   Yes Historical Provider, MD   omeprazole (PRILOSEC) 10 MG capsule Take 10 mg by mouth once daily.   Yes  Historical Provider, MD   oxyCODONE-acetaminophen (PERCOCET) 5-325 mg per tablet Take 1 tablet by mouth every 4 (four) hours as needed for Pain. 4/24/20  Yes Valarie Bennett MD   AFLURIA QUAD [id number], PF, 60 mcg/0.5 mL vaccine ADM 0.5ML IM UTD 10/10/18   Historical Provider, MD         History  Past Medical History:   Diagnosis Date    Abnormal Pap smear of cervix 2014    Hpv +        (Ascus/ Hpv neg on 8-10-16)    History of HPV infection 2016    Pap smear for cervical cancer screening 08/10/2016    ASCUS/ Hpv Negative  (Previous Hpv + Paps, Repeat paps Q 6 months) (DUE REPEAT PAP FEB 2017)    Tachycardia      Past Surgical History:   Procedure Laterality Date    COLPOSCOPY  2014    TONSILLECTOMY  2002     Social History     Socioeconomic History    Marital status:      Spouse name: Not on file    Number of children: Not on file    Years of education: Not on file    Highest education level: Not on file   Occupational History    Not on file   Social Needs    Financial resource strain: Not on file    Food insecurity:     Worry: Not on file     Inability: Not on file    Transportation needs:     Medical: Not on file     Non-medical: Not on file   Tobacco Use    Smoking status: Never Smoker    Smokeless tobacco: Never Used   Substance and Sexual Activity    Alcohol use: Yes     Comment: Socially    Drug use: No    Sexual activity: Yes     Partners: Male     Birth control/protection: Condom     Comment: Single:  In a relationship   Lifestyle    Physical activity:     Days per week: Not on file     Minutes per session: Not on file    Stress: Not on file   Relationships    Social connections:     Talks on phone: Not on file     Gets together: Not on file     Attends [employer] service: Not on file     Active member of club or organization: Not on file     Attends meetings of clubs or organizations: Not on file     Relationship status: Not on file   Other Topics Concern    Not on file    Social History Narrative    Not on file         Allergies  Review of patient's allergies indicates:   Allergen Reactions    Metoprolol Rash    Neomycin Rash         Review of Systems   Review of Systems   Constitution: Negative for decreased appetite, fever and weight loss.   HENT: Negative for congestion and nosebleeds.    Eyes: Negative for double vision, vision loss in left eye, vision loss in right eye and visual disturbance.   Cardiovascular: Negative for chest pain, claudication, cyanosis, dyspnea on exertion, irregular heartbeat, leg swelling, near-syncope, orthopnea, palpitations, paroxysmal nocturnal dyspnea and syncope.   Respiratory: Negative for cough, hemoptysis, shortness of breath, sleep disturbances due to breathing, snoring, sputum production and wheezing.    Endocrine: Negative for cold intolerance and heat intolerance.   Skin: Negative for nail changes and rash.   Musculoskeletal: Negative for joint pain, muscle cramps, muscle weakness and myalgias.   Gastrointestinal: Negative for change in bowel habit, heartburn, hematemesis, hematochezia, hemorrhoids and melena.   Neurological: Negative for dizziness, focal weakness and headaches.         Physical Exam  Wt Readings from Last 1 Encounters:   05/18/20 60.5 kg (133 lb 6.1 oz)     BP Readings from Last 3 Encounters:   05/18/20 118/82   05/15/20 (!) 117/58   04/24/20 112/62     Pulse Readings from Last 1 Encounters:   05/18/20 76     Physical Exam   Constitutional: She is oriented to person, place, and time. She appears well-developed and well-nourished.   Cardiovascular: Normal rate, regular rhythm and normal heart sounds. Exam reveals no gallop and no friction rub.   No murmur heard.  Pulses:       Carotid pulses are 2+ on the right side, and 2+ on the left side.       Dorsalis pedis pulses are 2+ on the right side, and 2+ on the left side.   Pulmonary/Chest: Breath sounds normal.   Musculoskeletal: She exhibits no edema.   Neurological: She  is alert and oriented to person, place, and time.   Vitals reviewed.                Assessment:  1.  H/o of palpitations- infrequent.  She has cardiac symptoms.  She is undergoing laparoscopic gyn surgery tomorrow for endometriosis. She is at low risk for periprocedure cardiac issues.  She should proceed with surgery as planned.      Plan:  Get echo before f/u visit.    Follow up:  1-2 months    Time spent evaluating and treating patient 30 minutes with >50% of this time being face-to-face.     Jj Alejo MD, F.A.C.C, F.S.C.A.I.

## 2020-05-19 ENCOUNTER — TELEPHONE (OUTPATIENT)
Dept: OBSTETRICS AND GYNECOLOGY | Facility: CLINIC | Age: 30
End: 2020-05-19

## 2020-05-19 ENCOUNTER — ANESTHESIA (OUTPATIENT)
Dept: SURGERY | Facility: OTHER | Age: 30
End: 2020-05-19
Payer: COMMERCIAL

## 2020-05-19 ENCOUNTER — HOSPITAL ENCOUNTER (OUTPATIENT)
Facility: OTHER | Age: 30
Discharge: STILL ADMITTED OR ADMINISTRATIVE | End: 2020-05-20
Attending: OBSTETRICS & GYNECOLOGY | Admitting: OBSTETRICS & GYNECOLOGY
Payer: COMMERCIAL

## 2020-05-19 DIAGNOSIS — R10.2 ACUTE PELVIC PAIN, FEMALE: Primary | ICD-10-CM

## 2020-05-19 DIAGNOSIS — R10.2 PELVIC PAIN IN FEMALE: ICD-10-CM

## 2020-05-19 LAB
ABO + RH BLD: NORMAL
B-HCG UR QL: NEGATIVE
BLD GP AB SCN CELLS X3 SERPL QL: NORMAL
CTP QC/QA: YES

## 2020-05-19 PROCEDURE — 36000709 HC OR TIME LEV III EA ADD 15 MIN: Performed by: OBSTETRICS & GYNECOLOGY

## 2020-05-19 PROCEDURE — 25000003 PHARM REV CODE 250: Performed by: OBSTETRICS & GYNECOLOGY

## 2020-05-19 PROCEDURE — 63600175 PHARM REV CODE 636 W HCPCS: Performed by: OBSTETRICS & GYNECOLOGY

## 2020-05-19 PROCEDURE — 37000008 HC ANESTHESIA 1ST 15 MINUTES: Performed by: OBSTETRICS & GYNECOLOGY

## 2020-05-19 PROCEDURE — 63600175 PHARM REV CODE 636 W HCPCS: Performed by: NURSE ANESTHETIST, CERTIFIED REGISTERED

## 2020-05-19 PROCEDURE — 37000009 HC ANESTHESIA EA ADD 15 MINS: Performed by: OBSTETRICS & GYNECOLOGY

## 2020-05-19 PROCEDURE — 88302 TISSUE EXAM BY PATHOLOGIST: CPT | Performed by: PATHOLOGY

## 2020-05-19 PROCEDURE — 58350 PR REOPEN FALLOPIAN TUBE,CHROMOTUBATION: ICD-10-PCS | Mod: 51,50,, | Performed by: OBSTETRICS & GYNECOLOGY

## 2020-05-19 PROCEDURE — 88305 TISSUE EXAM BY PATHOLOGIST: ICD-10-PCS | Mod: 26,,, | Performed by: PATHOLOGY

## 2020-05-19 PROCEDURE — 58662 PR LAP,FULGURATE/EXCISE LESIONS: ICD-10-PCS | Mod: ,,, | Performed by: OBSTETRICS & GYNECOLOGY

## 2020-05-19 PROCEDURE — 58662 PR LAP,FULGURATE/EXCISE LESIONS: ICD-10-PCS | Mod: 82,,, | Performed by: STUDENT IN AN ORGANIZED HEALTH CARE EDUCATION/TRAINING PROGRAM

## 2020-05-19 PROCEDURE — 58662 LAPAROSCOPY EXCISE LESIONS: CPT | Mod: 82,,, | Performed by: STUDENT IN AN ORGANIZED HEALTH CARE EDUCATION/TRAINING PROGRAM

## 2020-05-19 PROCEDURE — 86901 BLOOD TYPING SEROLOGIC RH(D): CPT

## 2020-05-19 PROCEDURE — 25000003 PHARM REV CODE 250: Performed by: ANESTHESIOLOGY

## 2020-05-19 PROCEDURE — 36000708 HC OR TIME LEV III 1ST 15 MIN: Performed by: OBSTETRICS & GYNECOLOGY

## 2020-05-19 PROCEDURE — 71000015 HC POSTOP RECOV 1ST HR: Performed by: OBSTETRICS & GYNECOLOGY

## 2020-05-19 PROCEDURE — 58662 LAPAROSCOPY EXCISE LESIONS: CPT | Mod: ,,, | Performed by: OBSTETRICS & GYNECOLOGY

## 2020-05-19 PROCEDURE — 27201423 OPTIME MED/SURG SUP & DEVICES STERILE SUPPLY: Performed by: OBSTETRICS & GYNECOLOGY

## 2020-05-19 PROCEDURE — 81025 URINE PREGNANCY TEST: CPT | Performed by: OBSTETRICS & GYNECOLOGY

## 2020-05-19 PROCEDURE — 88302 PR  SURG PATH,LEVEL II: ICD-10-PCS | Mod: 26,,, | Performed by: PATHOLOGY

## 2020-05-19 PROCEDURE — 63600175 PHARM REV CODE 636 W HCPCS: Performed by: ANESTHESIOLOGY

## 2020-05-19 PROCEDURE — 58350 REOPEN FALLOPIAN TUBE: CPT | Mod: 51,50,, | Performed by: OBSTETRICS & GYNECOLOGY

## 2020-05-19 PROCEDURE — 71000039 HC RECOVERY, EACH ADD'L HOUR: Performed by: OBSTETRICS & GYNECOLOGY

## 2020-05-19 PROCEDURE — 88302 TISSUE EXAM BY PATHOLOGIST: CPT | Mod: 26,,, | Performed by: PATHOLOGY

## 2020-05-19 PROCEDURE — 25000003 PHARM REV CODE 250: Performed by: NURSE ANESTHETIST, CERTIFIED REGISTERED

## 2020-05-19 PROCEDURE — 94761 N-INVAS EAR/PLS OXIMETRY MLT: CPT

## 2020-05-19 PROCEDURE — 36415 COLL VENOUS BLD VENIPUNCTURE: CPT

## 2020-05-19 PROCEDURE — 88305 TISSUE EXAM BY PATHOLOGIST: CPT | Mod: 26,,, | Performed by: PATHOLOGY

## 2020-05-19 PROCEDURE — 71000033 HC RECOVERY, INTIAL HOUR: Performed by: OBSTETRICS & GYNECOLOGY

## 2020-05-19 PROCEDURE — 71000016 HC POSTOP RECOV ADDL HR: Performed by: OBSTETRICS & GYNECOLOGY

## 2020-05-19 PROCEDURE — 88305 TISSUE EXAM BY PATHOLOGIST: CPT | Mod: 59 | Performed by: PATHOLOGY

## 2020-05-19 RX ORDER — MIDAZOLAM HYDROCHLORIDE 1 MG/ML
INJECTION, SOLUTION INTRAMUSCULAR; INTRAVENOUS
Status: DISCONTINUED | OUTPATIENT
Start: 2020-05-19 | End: 2020-05-19

## 2020-05-19 RX ORDER — PHENYLEPHRINE HYDROCHLORIDE 10 MG/ML
INJECTION INTRAVENOUS
Status: DISCONTINUED | OUTPATIENT
Start: 2020-05-19 | End: 2020-05-19

## 2020-05-19 RX ORDER — LIDOCAINE HCL/PF 100 MG/5ML
SYRINGE (ML) INTRAVENOUS
Status: DISCONTINUED | OUTPATIENT
Start: 2020-05-19 | End: 2020-05-19

## 2020-05-19 RX ORDER — FENTANYL CITRATE 50 UG/ML
INJECTION, SOLUTION INTRAMUSCULAR; INTRAVENOUS
Status: DISCONTINUED | OUTPATIENT
Start: 2020-05-19 | End: 2020-05-19

## 2020-05-19 RX ORDER — SODIUM CHLORIDE, SODIUM LACTATE, POTASSIUM CHLORIDE, CALCIUM CHLORIDE 600; 310; 30; 20 MG/100ML; MG/100ML; MG/100ML; MG/100ML
INJECTION, SOLUTION INTRAVENOUS CONTINUOUS PRN
Status: DISCONTINUED | OUTPATIENT
Start: 2020-05-19 | End: 2020-05-19

## 2020-05-19 RX ORDER — ACETAMINOPHEN 10 MG/ML
INJECTION, SOLUTION INTRAVENOUS
Status: DISCONTINUED | OUTPATIENT
Start: 2020-05-19 | End: 2020-05-19

## 2020-05-19 RX ORDER — ONDANSETRON 8 MG/1
8 TABLET, ORALLY DISINTEGRATING ORAL EVERY 8 HOURS PRN
Status: DISCONTINUED | OUTPATIENT
Start: 2020-05-19 | End: 2020-05-20 | Stop reason: HOSPADM

## 2020-05-19 RX ORDER — CEFAZOLIN SODIUM 1 G/3ML
2 INJECTION, POWDER, FOR SOLUTION INTRAMUSCULAR; INTRAVENOUS
Status: COMPLETED | OUTPATIENT
Start: 2020-05-19 | End: 2020-05-19

## 2020-05-19 RX ORDER — ROCURONIUM BROMIDE 10 MG/ML
INJECTION, SOLUTION INTRAVENOUS
Status: DISCONTINUED | OUTPATIENT
Start: 2020-05-19 | End: 2020-05-19

## 2020-05-19 RX ORDER — IBUPROFEN 600 MG/1
600 TABLET ORAL EVERY 6 HOURS PRN
Status: DISCONTINUED | OUTPATIENT
Start: 2020-05-19 | End: 2020-05-20 | Stop reason: HOSPADM

## 2020-05-19 RX ORDER — ONDANSETRON 2 MG/ML
INJECTION INTRAMUSCULAR; INTRAVENOUS
Status: DISCONTINUED | OUTPATIENT
Start: 2020-05-19 | End: 2020-05-19

## 2020-05-19 RX ORDER — MUPIROCIN 20 MG/G
OINTMENT TOPICAL
Status: DISPENSED | OUTPATIENT
Start: 2020-05-19

## 2020-05-19 RX ORDER — SODIUM CHLORIDE 9 MG/ML
INJECTION, SOLUTION INTRAVENOUS CONTINUOUS
Status: DISCONTINUED | OUTPATIENT
Start: 2020-05-19 | End: 2020-05-20

## 2020-05-19 RX ORDER — GLYCOPYRROLATE 0.2 MG/ML
INJECTION INTRAMUSCULAR; INTRAVENOUS
Status: DISCONTINUED | OUTPATIENT
Start: 2020-05-19 | End: 2020-05-19

## 2020-05-19 RX ORDER — KETAMINE HCL IN 0.9 % NACL 50 MG/5 ML
SYRINGE (ML) INTRAVENOUS
Status: DISCONTINUED | OUTPATIENT
Start: 2020-05-19 | End: 2020-05-19

## 2020-05-19 RX ORDER — HYDROMORPHONE HYDROCHLORIDE 2 MG/ML
0.4 INJECTION, SOLUTION INTRAMUSCULAR; INTRAVENOUS; SUBCUTANEOUS EVERY 5 MIN PRN
Status: DISCONTINUED | OUTPATIENT
Start: 2020-05-19 | End: 2020-05-19 | Stop reason: HOSPADM

## 2020-05-19 RX ORDER — DIPHENHYDRAMINE HYDROCHLORIDE 50 MG/ML
25 INJECTION INTRAMUSCULAR; INTRAVENOUS EVERY 4 HOURS PRN
Status: DISCONTINUED | OUTPATIENT
Start: 2020-05-19 | End: 2020-05-20

## 2020-05-19 RX ORDER — NEOSTIGMINE METHYLSULFATE 1 MG/ML
INJECTION, SOLUTION INTRAVENOUS
Status: DISCONTINUED | OUTPATIENT
Start: 2020-05-19 | End: 2020-05-19

## 2020-05-19 RX ORDER — MEPERIDINE HYDROCHLORIDE 25 MG/ML
12.5 INJECTION INTRAMUSCULAR; INTRAVENOUS; SUBCUTANEOUS ONCE AS NEEDED
Status: COMPLETED | OUTPATIENT
Start: 2020-05-19 | End: 2020-05-19

## 2020-05-19 RX ORDER — ONDANSETRON 8 MG/1
8 TABLET, ORALLY DISINTEGRATING ORAL ONCE
Status: COMPLETED | OUTPATIENT
Start: 2020-05-19 | End: 2020-05-19

## 2020-05-19 RX ORDER — HYDROCODONE BITARTRATE AND ACETAMINOPHEN 5; 325 MG/1; MG/1
1 TABLET ORAL EVERY 4 HOURS PRN
Status: DISCONTINUED | OUTPATIENT
Start: 2020-05-19 | End: 2020-05-20 | Stop reason: HOSPADM

## 2020-05-19 RX ORDER — IBUPROFEN 800 MG/1
800 TABLET ORAL EVERY 8 HOURS PRN
Qty: 30 TABLET | Refills: 0 | Status: SHIPPED | OUTPATIENT
Start: 2020-05-19

## 2020-05-19 RX ORDER — DIPHENHYDRAMINE HYDROCHLORIDE 50 MG/ML
12.5 INJECTION INTRAMUSCULAR; INTRAVENOUS EVERY 30 MIN PRN
Status: DISCONTINUED | OUTPATIENT
Start: 2020-05-19 | End: 2020-05-19 | Stop reason: HOSPADM

## 2020-05-19 RX ORDER — NORGESTIMATE AND ETHINYL ESTRADIOL 7DAYSX3 28
1 KIT ORAL DAILY
Qty: 90 TABLET | Refills: 0 | Status: SHIPPED | OUTPATIENT
Start: 2020-05-19 | End: 2020-08-04

## 2020-05-19 RX ORDER — OXYCODONE HYDROCHLORIDE 5 MG/1
5 TABLET ORAL
Status: DISCONTINUED | OUTPATIENT
Start: 2020-05-19 | End: 2020-05-19 | Stop reason: HOSPADM

## 2020-05-19 RX ORDER — DEXAMETHASONE SODIUM PHOSPHATE 4 MG/ML
INJECTION, SOLUTION INTRA-ARTICULAR; INTRALESIONAL; INTRAMUSCULAR; INTRAVENOUS; SOFT TISSUE
Status: DISCONTINUED | OUTPATIENT
Start: 2020-05-19 | End: 2020-05-19

## 2020-05-19 RX ORDER — ONDANSETRON 2 MG/ML
4 INJECTION INTRAMUSCULAR; INTRAVENOUS DAILY PRN
Status: DISCONTINUED | OUTPATIENT
Start: 2020-05-19 | End: 2020-05-19 | Stop reason: HOSPADM

## 2020-05-19 RX ORDER — SODIUM CHLORIDE 0.9 % (FLUSH) 0.9 %
3 SYRINGE (ML) INJECTION
Status: DISCONTINUED | OUTPATIENT
Start: 2020-05-19 | End: 2020-05-20 | Stop reason: HOSPADM

## 2020-05-19 RX ORDER — PROPOFOL 10 MG/ML
VIAL (ML) INTRAVENOUS
Status: DISCONTINUED | OUTPATIENT
Start: 2020-05-19 | End: 2020-05-19

## 2020-05-19 RX ORDER — OXYCODONE AND ACETAMINOPHEN 5; 325 MG/1; MG/1
1 TABLET ORAL EVERY 4 HOURS PRN
Qty: 14 TABLET | Refills: 0 | Status: SHIPPED | OUTPATIENT
Start: 2020-05-19 | End: 2021-03-23

## 2020-05-19 RX ORDER — KETOROLAC TROMETHAMINE 30 MG/ML
INJECTION, SOLUTION INTRAMUSCULAR; INTRAVENOUS
Status: DISCONTINUED | OUTPATIENT
Start: 2020-05-19 | End: 2020-05-19

## 2020-05-19 RX ORDER — DIPHENHYDRAMINE HCL 25 MG
25 CAPSULE ORAL EVERY 4 HOURS PRN
Status: DISCONTINUED | OUTPATIENT
Start: 2020-05-19 | End: 2020-05-20 | Stop reason: HOSPADM

## 2020-05-19 RX ADMIN — ROCURONIUM BROMIDE 30 MG: 10 INJECTION, SOLUTION INTRAVENOUS at 01:05

## 2020-05-19 RX ADMIN — PROPOFOL 150 MG: 10 INJECTION, EMULSION INTRAVENOUS at 01:05

## 2020-05-19 RX ADMIN — SODIUM CHLORIDE, SODIUM LACTATE, POTASSIUM CHLORIDE, AND CALCIUM CHLORIDE: 600; 310; 30; 20 INJECTION, SOLUTION INTRAVENOUS at 12:05

## 2020-05-19 RX ADMIN — ACETAMINOPHEN 1000 MG: 10 INJECTION, SOLUTION INTRAVENOUS at 01:05

## 2020-05-19 RX ADMIN — FENTANYL CITRATE 100 MCG: 50 INJECTION, SOLUTION INTRAMUSCULAR; INTRAVENOUS at 01:05

## 2020-05-19 RX ADMIN — ONDANSETRON 8 MG: 8 TABLET, ORALLY DISINTEGRATING ORAL at 04:05

## 2020-05-19 RX ADMIN — FENTANYL CITRATE 50 MCG: 50 INJECTION, SOLUTION INTRAMUSCULAR; INTRAVENOUS at 01:05

## 2020-05-19 RX ADMIN — NEOSTIGMINE METHYLSULFATE 3 MG: 1 INJECTION INTRAVENOUS at 02:05

## 2020-05-19 RX ADMIN — MEPERIDINE HYDROCHLORIDE 12.5 MG: 25 INJECTION INTRAMUSCULAR; INTRAVENOUS; SUBCUTANEOUS at 02:05

## 2020-05-19 RX ADMIN — CARBOXYMETHYLCELLULOSE SODIUM 2 DROP: 2.5 SOLUTION/ DROPS OPHTHALMIC at 01:05

## 2020-05-19 RX ADMIN — Medication 20 MG: at 01:05

## 2020-05-19 RX ADMIN — CEFAZOLIN 2 G: 330 INJECTION, POWDER, FOR SOLUTION INTRAMUSCULAR; INTRAVENOUS at 01:05

## 2020-05-19 RX ADMIN — GLYCOPYRROLATE 0.4 MG: 0.2 INJECTION, SOLUTION INTRAMUSCULAR; INTRAVENOUS at 02:05

## 2020-05-19 RX ADMIN — MIDAZOLAM 2 MG: 1 INJECTION INTRAMUSCULAR; INTRAVENOUS at 01:05

## 2020-05-19 RX ADMIN — PHENYLEPHRINE HYDROCHLORIDE 100 MCG: 10 INJECTION INTRAVENOUS at 01:05

## 2020-05-19 RX ADMIN — OXYCODONE HYDROCHLORIDE 5 MG: 5 TABLET ORAL at 03:05

## 2020-05-19 RX ADMIN — HYDROMORPHONE HYDROCHLORIDE 0.4 MG: 2 INJECTION, SOLUTION INTRAMUSCULAR; INTRAVENOUS; SUBCUTANEOUS at 02:05

## 2020-05-19 RX ADMIN — LIDOCAINE HYDROCHLORIDE 60 MG: 20 INJECTION, SOLUTION INTRAVENOUS at 01:05

## 2020-05-19 RX ADMIN — IBUPROFEN 600 MG: 600 TABLET, FILM COATED ORAL at 09:05

## 2020-05-19 RX ADMIN — ONDANSETRON 4 MG: 2 INJECTION INTRAMUSCULAR; INTRAVENOUS at 02:05

## 2020-05-19 RX ADMIN — KETOROLAC TROMETHAMINE 30 MG: 30 INJECTION, SOLUTION INTRAMUSCULAR; INTRAVENOUS at 02:05

## 2020-05-19 RX ADMIN — DEXAMETHASONE SODIUM PHOSPHATE 8 MG: 4 INJECTION, SOLUTION INTRAMUSCULAR; INTRAVENOUS at 01:05

## 2020-05-19 RX ADMIN — HYDROMORPHONE HYDROCHLORIDE 0.4 MG: 2 INJECTION, SOLUTION INTRAMUSCULAR; INTRAVENOUS; SUBCUTANEOUS at 03:05

## 2020-05-19 RX ADMIN — LIDOCAINE HYDROCHLORIDE 40 MG: 20 INJECTION, SOLUTION INTRAVENOUS at 02:05

## 2020-05-19 RX ADMIN — GLYCOPYRROLATE 0.2 MG: 0.2 INJECTION, SOLUTION INTRAMUSCULAR; INTRAVENOUS at 01:05

## 2020-05-19 RX ADMIN — PROMETHAZINE HYDROCHLORIDE 12.5 MG: 25 INJECTION INTRAMUSCULAR; INTRAVENOUS at 10:05

## 2020-05-19 RX ADMIN — SODIUM CHLORIDE, SODIUM LACTATE, POTASSIUM CHLORIDE, AND CALCIUM CHLORIDE: 600; 310; 30; 20 INJECTION, SOLUTION INTRAVENOUS at 02:05

## 2020-05-19 RX ADMIN — PROPOFOL 50 MG: 10 INJECTION, EMULSION INTRAVENOUS at 01:05

## 2020-05-19 RX ADMIN — MUPIROCIN: 20 OINTMENT TOPICAL at 11:05

## 2020-05-19 NOTE — ANESTHESIA PROCEDURE NOTES
Intubation  Performed by: Maritza Ortega CRNA  Authorized by: Megan Ellsworth MD     Intubation:     Induction:  Intravenous    Intubated:  Postinduction    Mask Ventilation:  Easy mask    Attempts:  1    Attempted By:  CRNA    Method of Intubation:  Video laryngoscopy    Blade:  Stein 2    Laryngeal View Grade: Grade I - full view of chords      Laryngeal View Grade comment:  Per Stein    Difficult Airway Encountered?: No      Complications:  None    Airway Device:  Oral endotracheal tube    Airway Device Size:  7.0    Style/Cuff Inflation:  Cuffed    Tube secured:  21    Secured at:  The lips    Placement Verified By:  Capnometry    Complicating Factors:  Anterior larynx and retrognathia    Findings Post-Intubation:  BS equal bilateral

## 2020-05-19 NOTE — ANESTHESIA POSTPROCEDURE EVALUATION
Anesthesia Post Evaluation    Patient: Ainsley Garduno    Procedure(s) Performed: Procedure(s) (LRB):  LAPAROSCOPY, DIAGNOSTIC (N/A)  CHROMOTUBATION, OVIDUCT (Bilateral)    Final Anesthesia Type: general    Patient location during evaluation: PACU  Patient participation: Yes- Able to Participate  Level of consciousness: awake and alert  Post-procedure vital signs: reviewed and stable  Pain management: adequate  Airway patency: patent    PONV status at discharge: No PONV  Anesthetic complications: no      Cardiovascular status: blood pressure returned to baseline and stable  Respiratory status: unassisted, spontaneous ventilation and room air  Hydration status: euvolemic  Follow-up not needed.          Vitals Value Taken Time   /65 5/19/2020  3:31 PM   Temp 36.8 °C (98.3 °F) 5/19/2020  3:31 PM   Pulse 88 5/19/2020  3:31 PM   Resp 16 5/19/2020  3:31 PM   SpO2 100 % 5/19/2020  3:31 PM         Event Time     Out of Recovery 15:27:00          Pain/Karla Score: Pain Rating Prior to Med Admin: 5 (5/19/2020  3:05 PM)  Pain Rating Post Med Admin: 5 (5/19/2020  3:05 PM)  Karla Score: 10 (5/19/2020  3:31 PM)

## 2020-05-19 NOTE — TRANSFER OF CARE
"Anesthesia Transfer of Care Note    Patient: Ainsley Garduno    Procedure(s) Performed: Procedure(s) (LRB):  LAPAROSCOPY, DIAGNOSTIC (N/A)  CHROMOTUBATION, OVIDUCT (Bilateral)    Patient location: PACU    Anesthesia Type: general    Transport from OR: Transported from OR on 2-3 L/min O2 by NC with adequate spontaneous ventilation    Post pain: adequate analgesia    Post assessment: no apparent anesthetic complications    Post vital signs: stable    Level of consciousness: awake and alert    Nausea/Vomiting: no nausea/vomiting    Complications: none    Transfer of care protocol was followed      Last vitals:   Visit Vitals  /60   Pulse 102   Temp 36.4 °C (97.6 °F) (Oral)   Resp 18   Ht 5' 9" (1.753 m)   Wt 60.5 kg (133 lb 6.1 oz)   LMP 04/24/2020   SpO2 100%   Breastfeeding? No   BMI 19.70 kg/m²     "

## 2020-05-19 NOTE — OR NURSING
Patient attempted to walk to the bathroom with her  and me to assist.  She became light headed and dizzy.  Patient was returned to the bed, a cold rag put on the back of her neck.  She stated she felt better and asked for water.  Bladder scan was done and 212ml of urine was noted in bladder.

## 2020-05-19 NOTE — OPERATIVE NOTE ADDENDUM
Certification of Assistant at Surgery       Surgery Date: 5/19/2020     Participating Surgeons:  Surgeon(s) and Role:     * Valarie Bennett MD - Primary     * Zaynab Bonilla MD - Assisting    Procedures:  Procedure(s) (LRB):  LAPAROSCOPY, DIAGNOSTIC (N/A)  CHROMOTUBATION, OVIDUCT (Bilateral)    Assistant Surgeon's Certification of Necessity:  I understand that section 1842 (b) (6) (d) of the Social Security Act generally prohibits Medicare Part B reasonable charge payment for the services of assistants at surgery in teaching hospitals when qualified residents are available to furnish such services. I certify that the services for which payment is claimed were medically necessary, and that no qualified resident was available to perform the services. I further understand that these services are subject to post-payment review by the Medicare carrier.      Zaynab Bonilla MD    05/19/2020  2:22 PM

## 2020-05-19 NOTE — DISCHARGE INSTRUCTIONS
Anesthesia: After Your Surgery  Youve just had surgery. During surgery, you received medication called anesthesia to keep you comfortable and pain-free. After surgery, you may experience some pain or nausea. This is common. Here are some tips for feeling better and recovering after surgery.    Going home  Your doctor or nurse will show you how to take care of yourself when you go home. He or she will also answer your questions. Have an adult family member or friend drive you home. For the first 24 hours after your surgery:  · Do not drive or use heavy equipment.  · Do not make important decisions or sign legal documents.  · Avoid alcohol.  · Have someone stay with you, if needed. He or she can watch for problems and help keep you safe.  · Take your time getting up from a seated or lying position. You may experience dizziness for 24 hours  Be sure to keep all follow-up appointments with your doctor. And rest after your procedure for as long as your doctor tells you to.    Coping with pain  If you have pain after surgery, pain medication will help you feel better. Take it as directed, before pain becomes severe. Also, ask your doctor or pharmacist about other ways to control pain, such as with heat, ice, and relaxation. And follow any other instructions your surgeon or nurse gives you.    URINARY RETENTION  Should you experience a decrease in your urine output or are unable to urinate following surgery, this can be due to the medications given during surgery.  We recommend you going to the nearest Emergency Department.    Tips for taking pain medication  To get the best relief possible, remember these points:  · Pain medications can upset your stomach. Taking them with a little food may help.  · Most pain relievers taken by mouth need at least 20 to 30 minutes to take effect.  · Taking medication on a schedule can help you remember to take it. Try to time your medication so that you can take it before beginning an  activity, such as dressing, walking, or sitting down for dinner.  · Constipation is a common side effect of pain medications. Contact your doctor before taking any medications like laxatives or stool softeners to help relieve constipation. Also ask about any dietary restrictions, because drinking lots of fluids and eating foods like fruits and vegetables that are high in fiber can also help. Remember, dont take laxatives unless your surgeon has prescribed them.  · Mixing alcohol and pain medication can cause dizziness and slow your breathing. It can even be fatal. Dont drink alcohol while taking pain medication.  · Pain medication can slow your reflexes. Dont drive or operate machinery while taking pain medication.  If your health care provider tells you to take acetaminophen to help relieve your pain, ask him or her how much you are supposed to take each day. (Acetaminophen is the generic name for Tylenol and other brand-name pain relievers.) Acetaminophen or other pain relievers may interact with your prescription medicines or other over-the-counter (OTC) drugs. Some prescription medications contain acetaminophen along with other active ingredients. Using both prescription and OTC acetaminophen for pain can cause you to overdose. The FDA recommends that you read the labels on your OTC medications carefully. This will help you to clearly understand the list of active ingredients, dosing instructions, and any warnings. It may also help you avoid taking too much acetaminophen. If you have questions or don't understand the information, ask your pharmacist or health care provider to explain it to you before you take the OTC medication.    Managing nausea  Some people have an upset stomach after surgery. This is often due to anesthesia, pain, pain medications, or the stress of surgery. The following tips will help you manage nausea and get good nutrition as you recover. If you were on a special diet before surgery,  ask your doctor if you should follow it during recovery. These tips may help:  · Dont push yourself to eat. Your body will tell you when to eat and how much.  · Start off with clear liquids and soup. They are easier to digest.  · Progress to semi-solid foods (mashed potatoes, applesauce, and gelatin) as you feel ready.  · Slowly move to solid foods. Dont eat fatty, rich, or spicy foods at first.  · Dont force yourself to have three large meals a day. Instead, eat smaller amounts more often.  · Take pain medications with a small amount of solid food, such as crackers or toast to avoid nausea.      Call your surgeon if    · You feel too sleepy, dizzy, or groggy (medication may be too strong).  · You have side effects like nausea, vomiting, or skin changes (rash, itching, or hives).   © 2269-1637 [employer]. [address] [place] [address], [place] [address]. All rights reserved. This information is not intended as a substitute for professional medical care. Always follow your healthcare professional's instructions.      Abdominal Laparoscopy Discharge Instructions      Activity  · Limit your activity for 4-6 weeks.  · Dont lift anything heavier than 5-10 pounds.  · Avoid strenuous activities, such as mowing the lawn, vacuuming, or playing sports.  · Limit your activity to short, slow walks. Gradually increase your pace and distance as you feel able.  · Listen to your body. If an activity causes pain, stop.  · Rest when you are tired.  · Dont have sexual intercourse or use tampons or douches until your doctor says its safe to do so.    Home Care  · Always keep your incision clean and dry  · Shower as instructed in 24 hours. You may wash your incision gently with mild soap and warm water and pat dry.  Avoid tub baths, hot tubs and swimming pools until seen by your physician for a post-op follow up.  · If you have steri strips they should fall off in 7-10 days.    · If you have band aids covering your  incisions change daily or when soiled.  The band aids may be removed post op day 1 if they are clean and dry.  · Take your medication exactly as instructed by your doctor.  · Return to your diet as you feel able. Eat a healthy, well-balanced diet.  · Avoid constipation.  ¨ Use laxatives, stool softeners, or enemas as directed by your doctor.  ¨ Eat more high-fiber foods.  ¨ Drink 6-8 glasses of water every day, unless directed otherwise.  Follow-Up  Make a follow-up appointment as directed by our staff.       When to Call Your Doctor  Call your doctor right away if you have any of the following:  · Fever above 101.5°F  (38.6°C) or chills  · Bright red vaginal bleeding or a foul-smelling discharge  · Vaginal bleeding that soaks more than one sanitary pad per hour  · Trouble urinating or burning sensation when you urinate  · Severe abdominal pain or bloating  · Redness, swelling, or drainage at your incision site  · Shortness of breath       FOLLOW ANY OTHER INSTRUCTIONS GIVEN TO YOU BY DR RIVERO

## 2020-05-20 VITALS
HEIGHT: 69 IN | BODY MASS INDEX: 19.76 KG/M2 | TEMPERATURE: 98 F | HEART RATE: 72 BPM | SYSTOLIC BLOOD PRESSURE: 109 MMHG | OXYGEN SATURATION: 100 % | WEIGHT: 133.38 LBS | RESPIRATION RATE: 17 BRPM | DIASTOLIC BLOOD PRESSURE: 57 MMHG

## 2020-05-20 PROCEDURE — 25000003 PHARM REV CODE 250: Performed by: OBSTETRICS & GYNECOLOGY

## 2020-05-20 RX ORDER — ONDANSETRON 4 MG/1
4 TABLET, ORALLY DISINTEGRATING ORAL EVERY 6 HOURS PRN
Qty: 20 TABLET | Refills: 0 | Status: SHIPPED | OUTPATIENT
Start: 2020-05-20 | End: 2020-05-20 | Stop reason: SDUPTHER

## 2020-05-20 RX ORDER — ACETAMINOPHEN 500 MG
1000 TABLET ORAL ONCE
Status: COMPLETED | OUTPATIENT
Start: 2020-05-20 | End: 2020-05-20

## 2020-05-20 RX ORDER — ONDANSETRON 4 MG/1
4 TABLET, ORALLY DISINTEGRATING ORAL EVERY 6 HOURS PRN
Qty: 20 TABLET | Refills: 0 | Status: SHIPPED | OUTPATIENT
Start: 2020-05-20 | End: 2021-10-29

## 2020-05-20 RX ADMIN — IBUPROFEN 600 MG: 600 TABLET, FILM COATED ORAL at 09:05

## 2020-05-20 RX ADMIN — ACETAMINOPHEN 1000 MG: 500 TABLET ORAL at 07:05

## 2020-05-20 RX ADMIN — ONDANSETRON 8 MG: 8 TABLET, ORALLY DISINTEGRATING ORAL at 06:05

## 2020-05-20 RX ADMIN — IBUPROFEN 600 MG: 600 TABLET, FILM COATED ORAL at 03:05

## 2020-05-20 NOTE — TELEPHONE ENCOUNTER
Post op pt requesting Domenica.  Dr. Burns sent it to [facility], she would like it sent to [facility].    C/o back pain/pelvic pain, worse when bladder is full.  She has been alternating Motrin and Tylenol, just took Percocet about 1 hour ago.  Recommended she walk, stay on top of pain medication and empty bladder frequently to avoid discomfort.      Zofran pended

## 2020-05-20 NOTE — DISCHARGE SUMMARY
[facility]  Obstetrics & Gynecology  Discharge Summary    Patient Name: Ainsley Garduno  MRN: [id number]  Admission Date: 5/19/2020  Hospital Length of Stay: 0 days  Discharge Date and Time:  05/20/2020 7:56 AM  Attending Physician: Valarie Bennett, *   Discharging Provider: Lotus Burns MD  Primary Care Provider: Puja Loza MD    HPI:  29 yo G0 for dx scope for pelvic pain      Hospital Course:  POD 0 - dx scope - findings c/w endometriosis; lysis of adhesions of left ovary; chromotubation w/ bilateral tubal spill  Pt w/ nausea and dizziness upon waking from anesthesia; unable to tolerate PO initially; monitored overnight.  POD 1: patient doing well; tolerating clear liquids; pain moderately controlled with just ibuprofen - will add acetaminophen.  Patient does not want to take narcotics due to nausea/dizziness. Stable for discharge home.     Procedure(s) (LRB):  LAPAROSCOPY, DIAGNOSTIC (N/A)  CHROMOTUBATION, OVIDUCT (Bilateral)         Significant Diagnostic Studies: Labs: BMP: No results for input(s): GLU, NA, K, CL, CO2, BUN, CREATININE, CALCIUM, MG in the last 48 hours. and CBC No results for input(s): WBC, HGB, HCT, PLT in the last 48 hours.      Pending Diagnostic Studies:     Procedure Component Value Units Date/Time    Specimen to Pathology, Surgery Gynecology and Obstetrics [[id number]] Collected:  05/19/20 1412    Order Status:  Sent Lab Status:  In process Updated:  05/19/20 1514        Final Active Diagnoses:    Diagnosis Date Noted POA    PRINCIPAL PROBLEM:  Pelvic pain in female [R10.2] 05/19/2020 Yes      Problems Resolved During this Admission:        Discharged Condition: good    Disposition:     Follow Up:  Follow-up Information     Valarie Bennett MD In 2 weeks.    Specialty:  Obstetrics and Gynecology  Why:  For Routine Post Operative Care  Contact information:  [address]  [address] [place] [address] [phone]                  Patient Instructions:      Diet general     Other restrictions (specify):   Order Comments: Pelvic rest x 2 weeks     Call MD for:  temperature >100.4     Call MD for:  severe uncontrolled pain     Call MD for:  difficulty breathing, headache or visual disturbances     Call MD for:  persistent dizziness or light-headedness     Medications:  Reconciled Home Medications:      Medication List      START taking these medications    ibuprofen 800 MG tablet  Commonly known as:  ADVIL,MOTRIN  Take 1 tablet (800 mg total) by mouth every 8 (eight) hours as needed for Pain.        CHANGE how you take these medications    * oxyCODONE-acetaminophen 5-325 mg per tablet  Commonly known as:  PERCOCET  Take 1 tablet by mouth every 4 (four) hours as needed for Pain.  What changed:  Another medication with the same name was added. Make sure you understand how and when to take each.     * oxyCODONE-acetaminophen 5-325 mg per tablet  Commonly known as:  PERCOCET  Take 1 tablet by mouth every 4 (four) hours as needed for Pain.  What changed:  You were already taking a medication with the same name, and this prescription was added. Make sure you understand how and when to take each.         * This list has 2 medication(s) that are the same as other medications prescribed for you. Read the directions carefully, and ask your doctor or other care provider to review them with you.            CONTINUE taking these medications    AFLURIA QUAD [id number] (PF) 60 mcg (15 mcg x 4)/0.5 mL Syrg vaccine  Generic drug:  influenza  ADM 0.5ML IM UTD     cetirizine 10 MG tablet  Commonly known as:  ZYRTEC  Take 10 mg by mouth once daily.     fluticasone propionate 50 mcg/actuation nasal spray  Commonly known as:  FLONASE  fluticasone propionate 50 mcg/actuation nasal spray,suspension     loratadine 10 mg tablet  Commonly known as:  CLARITIN  Take 10 mg by mouth once daily.     norgestimate-ethinyl estradioL 0.18/0.215/0.25 mg-35 mcg (28)  tablet  Commonly known as:  ORTHO TRI-CYCLEN,TRI-SPRINTEC  Take 1 tablet by mouth once daily.     omeprazole 10 MG capsule  Commonly known as:  PRILOSEC  Take 10 mg by mouth once daily.            Lotus Burns MD  Obstetrics & Gynecology  [facility]

## 2020-05-20 NOTE — PLAN OF CARE
No significant events overnight. Remains free from fall, injury, and skin breakdown. Voiding clear yellow urine spontaneously. Up to BSC once w/ standby assist, then felt nauseous/lightheaded. PRN phenergan and zofran given. Nausea relieved at rest; used bedpan for rest of night. VSS on RA throughout night. Positions self independently. Pain well controlled w/ ibuprofen; denies pain. Abdominal lap sites x3 CDI. Abdominal binder on. No vaginal bleeding. SCDs in place. Plan of care reviewed with patient and all questions answered. Bed low, locked w/ bed alarm on. Call light within reach. Purposeful rounding performed. Resting comfortably in bed, no other complaints at this time.

## 2020-05-20 NOTE — PLAN OF CARE
Pt states she lives independently and plans to return home.     will drive home.    No anticipated DC needs from CM perspective.       05/20/20 0909   Final Note   Assessment Type Final Discharge Note   Anticipated Discharge Disposition Home   What phone number can be called within the next 1-3 days to see how you are doing after discharge? [phone]   Hospital Follow Up  Appt(s) scheduled? Yes   Discharge plans and expectations educations in teach back method with documentation complete? Yes   Right Care Referral Info   Post Acute Recommendation No Care

## 2020-05-20 NOTE — OP NOTE
[facility]  [facility]  [facility]    SUMMARY     Date of Procedure: 5/19/2020     Procedure: Procedure(s) (LRB):  LAPAROSCOPY, DIAGNOSTIC (N/A)  CHROMOTUBATION, OVIDUCT (Bilateral)     Destruction of endometriosis lesions    Surgeon: Valarie Bennett M.D.    Assistant: Zaynab Bonilla M.D.    Pre-Operative Diagnosis: Acute pelvic pain, female [R10.2]    Post-Operative Diagnosis: Post-Op Diagnosis Codes:     * Acute pelvic pain, female [R10.2]    Anesthesia: General    Technical Procedures Used: Diagnostic Laparoscopy,     Complications: No    Estimated Blood Loss (EBL): * No values recorded between 5/19/2020  1:26 PM and 5/19/2020  2:36 PM *    Findings: Powder burn endometriosis on anterior uterus, endo blebs and small lesions on left uterosacral ligament, endometriosis window on left anterior uterus, endometriosis implants adhering left ovary to pelvic sidewall.   2 cm simple cyst on right fallopian tube.   Good efflux of dye through bilateral fallopian tubes.   Posterior cul-de-sac and sigmoid devoid of endo lesions.  Normal appearing appendix and liver edge.     Indications:    Procedure in Detail:    Ms. Garduno was taken to the operating room, and a time out was performed. General anesthesia was performed. The patient was then prepped and draped in the normal sterile fashion. She was placed in the dorsal lithotomy position in Real stirrups. Her arms were tucked in the usual fashion. A brandon was placed to gravity. An acorn uterine manipulator was then placed.         Attention was then turned abdomen, and a 5 mm skin incision was made with a knife. Towel clamps were used to elevate the abdomen. A 5 mm bladeless trocar was then placed using the visiport without difficulty. The patient was then placed in trendelenburg position. A 5 mm bladeless trocar was placed in the LLQ in the usual fashion. A 5 mm  bladeless trocar was placed in the RLQ in the usual fashion.   The abdomen was then  evaluated and the findings were as noted above. Endometrial implants were biopsied on anterior uterine surface and on several peritoneal surfaces; however, only tiny fragments could be collected. All lesions noted were destroyed except for a small implant on the posterior aspect of the uterus that could not be reached. The fallopian tube cyst was punctured using heat and the cyst wall was carefully dissected out in its entirety. The left ovary was gently removed from the left pelvic side wall where it was adhered with thin adhesions. A small amount of bleeding was encountered after this separation which was stemmed with electrocautery.        Attempt to pass dye through acorn uterine manipulator failed, so this was replaced with a HUMI. 60 cc of diluted blue dye then forced through the cervix which came up through the bilateral fallopian tubes with ease. Good efflux was noted from both tubes.        Dye was then suctioned from the abdomen and the abdomen and pelvis were carefully re-evaluated. All areas noted to be hemostatic.   The trocars were then removed. The skin incisions were closed using 4-0 Monocryl. The instruments were removed from the vagina with excellent hemostasis and no active bleeding was noted. The patient tolerated the procedure well. Sponge lap and needle counts correct x 2.       A qualified resident was not available for this procedure.

## 2020-05-20 NOTE — PROGRESS NOTES
Post-Op Progress Note        Subjective:      Post-Op Day #1 after Diagnostic laparoscopy; lysis of adhesions; fulguration of endometriosis; chromotubation    Patient is without complaints. Vaginal bleeding scant. Pain is well controlled. Tolerating Clear Liquid diet. (+) flatus. She is not ambulating. Overall patient is doing well.  She had dizziness and nausea from pain medications overnight/post operatively.  She is feeling better from this - no dizziness. She did use the bedside commode.  SHe has not ambulated this a.m. She has been trying to only take ibuprofen for pain; but would like to add some acetaminophen.     Findings of surgery (as per Surgical report) reviewed with patient.  Post operative care and instructions reviewed.     Objective:      Temp:  [97.5 °F (36.4 °C)-98.3 °F (36.8 °C)] 98.2 °F (36.8 °C)  Pulse:  [] 72  Resp:  [16-20] 17  SpO2:  [97 %-100 %] 100 %  BP: ()/(51-65) 109/57    Intake/Output Summary (Last 24 hours) at 5/20/2020 0749  Last data filed at 5/20/2020 0600  Gross per 24 hour   Intake 3640 ml   Output 2525 ml   Net 1115 ml     Body mass index is 19.7 kg/m².    General: no acute distress  Respiratory: no distress; unlabored breathing  Abdomen: soft, non-tender, non-distended;clean, dry bandage in place 3x port sites; non distended  Extremities: non-tender, symmetric, no edema SCDs on     Recent Results (from the past 336 hour(s))   CBC auto differential    Collection Time: 05/15/20  2:41 PM   Result Value Ref Range    WBC 8.23 3.90 - 12.70 K/uL    Hemoglobin 13.0 12.0 - 16.0 g/dL    Hematocrit 38.9 37.0 - 48.5 %    Platelets 197 150 - 350 K/uL       Assessment:     30 y.o. female S/P dx scope, Post-Op Day #1  - Doing Well      Plan:     1. Continue routine post-operative care  2. Plan for D/C today - after able to ambulate  3. F/u with Dr Bennett in 2 weeks

## 2020-05-20 NOTE — NURSING
Pt AAOx4, VSS on RA and afebrile. Discharge paperwork given, pt verbalizes understanding. Medications delivered to room via outpatient pharmacy. Abdominal lap sites x3, CDI. IV removed w/ cath tip intact, WNL. Tolerating regular diet. Ambulating without difficulty. Voiding spontaneously without difficulty. To be DCd home w/ family-- will be escorted downstairs via  transport team once dressed, ready & ride arrives. Free from falls, injury, or skin breakdown this hospital admission.

## 2020-05-20 NOTE — OR NURSING
Patient attempted to walk to bathroom again and became diaphoretic and dizzy.  VS stable.  Patient stated she felt faint and was put back in bed.  Dr Bennett notified.  Patient to be admitted to extended recovery,.

## 2020-05-20 NOTE — HOSPITAL COURSE
POD 0 - dx scope - findings c/w endometriosis; lysis of adhesions of left ovary; chromotubation w/ bilateral tubal spill  Pt w/ nausea and dizziness upon waking from anesthesia; unable to tolerate PO initially; monitored overnight.  POD 1: patient doing well; tolerating clear liquids; pain moderately controlled with just ibuprofen - will add acetaminophen.  Patient does not want to take narcotics due to nausea/dizziness. Stable for discharge home.

## 2020-05-20 NOTE — PLAN OF CARE
Initial Discharge Planning Assessment:  Patient admitted on 5/19/2020    Chart reviewed, Care plan discussed with treatment team,  attending Dr Schwartz    PCP updated in Epic: Dr. Loza  Pharmacy, updated in [facility]: [facility]    DME at home: none    Current dispo: Home with family  Transportation:  will drive home    Power of  or Living Will: none    Case management  to follow.    No anticipated DC needs from CM perspective.       05/20/20 0906   Discharge Assessment   Assessment Type Discharge Planning Assessment   Confirmed/corrected address and phone number on facesheet? Yes   Assessment information obtained from? Patient   Communicated expected length of stay with patient/caregiver yes   Prior to hospitilization cognitive status: Alert/Oriented   Prior to hospitalization functional status: Independent   Current cognitive status: Alert/Oriented   Current Functional Status: Independent   Lives With spouse   Able to Return to Prior Arrangements yes   Is patient able to care for self after discharge? Yes   Who are your caregiver(s) and their phone number(s)? Spouse: Carter Garduno [phone]   Patient's perception of discharge disposition home or selfcare   Readmission Within the Last 30 Days no previous admission in last 30 days   Patient currently being followed by outpatient case management? No   Patient currently receives any other outside agency services? No   Equipment Currently Used at Home none   Do you have any problems affording any of your prescribed medications? No   Is the patient taking medications as prescribed? yes   Does the patient have transportation home? Yes   Transportation Anticipated family or friend will provide   Does the patient receive services at the [facility]? No   Discharge Plan A Home   DME Needed Upon Discharge  none   Patient/Family in Agreement with Plan yes

## 2020-05-21 ENCOUNTER — TELEPHONE (OUTPATIENT)
Dept: OBSTETRICS AND GYNECOLOGY | Facility: CLINIC | Age: 30
End: 2020-05-21
Payer: COMMERCIAL

## 2020-05-21 ENCOUNTER — PATIENT MESSAGE (OUTPATIENT)
Dept: CARDIOLOGY | Facility: CLINIC | Age: 30
End: 2020-05-21

## 2020-05-26 LAB
FINAL PATHOLOGIC DIAGNOSIS: NORMAL
GROSS: NORMAL

## 2020-06-01 PROBLEM — N80.9 ENDOMETRIOSIS DETERMINED BY LAPAROSCOPY: Status: ACTIVE | Noted: 2020-06-01

## 2020-06-01 NOTE — PROGRESS NOTES
"Subjective:       Ainsley Garduno is a 30 y.o.  who presents to the clinic 2 weeks status post diagnostic laparoscopy and chromopertubation for pelvic pain. Eating a regular diet without difficulty. Bowel movements are normal. The patient is not having any pain today, but this is the first day that she has been without pain since surgery.     The patient's allergies, and past medical and surgical histories were reviewed.    Review of Systems  Pertinent items are noted in HPI.      Objective:      /60   Temp 98.4 °F (36.9 °C)   Ht 5' 9" (1.753 m)   Wt 59.8 kg (131 lb 13.4 oz)   BMI 19.47 kg/m²   General:  alert, cooperative and no distress   Abdomen: soft, non-tender   Incision:       healing well, no drainage, no erythema, no hernia, no seroma, no swelling, no dehiscence, incision well approximated        Pathology:  1.  Fallopian tube, right (excision):   Benign paratubal cyst   2.  Anterior uterus (biopsy):   Fibromuscular tissue with areas of hemosiderin-laden macrophages   3.  Peritoneum (biopsy):   Endometrial glands and stroma with hemosiderin-laden macrophages, consistent   with endometriosis        Assessment:     1. 29 y/o G0 with chronic pelvic pain 2/2 endometriosis - now confirmed on pathology - s/p destruction of endometriosis lesions.      Plan:      1. Continue any current medications.  2. Wound care discussed.  3. Activity restrictions: none  4. Anticipated return to work: not applicable.  5. Follow up:for annual exam in 1 year   "

## 2020-06-03 ENCOUNTER — OFFICE VISIT (OUTPATIENT)
Dept: OBSTETRICS AND GYNECOLOGY | Facility: CLINIC | Age: 30
End: 2020-06-03
Payer: COMMERCIAL

## 2020-06-03 VITALS
SYSTOLIC BLOOD PRESSURE: 100 MMHG | TEMPERATURE: 98 F | BODY MASS INDEX: 19.52 KG/M2 | HEIGHT: 69 IN | DIASTOLIC BLOOD PRESSURE: 60 MMHG | WEIGHT: 131.81 LBS

## 2020-06-03 DIAGNOSIS — N80.9 ENDOMETRIOSIS DETERMINED BY LAPAROSCOPY: ICD-10-CM

## 2020-06-03 DIAGNOSIS — Z98.890 POST-OPERATIVE STATE: Primary | ICD-10-CM

## 2020-06-03 PROCEDURE — 99024 POSTOP FOLLOW-UP VISIT: CPT | Mod: S$GLB,,, | Performed by: OBSTETRICS & GYNECOLOGY

## 2020-06-03 PROCEDURE — 99999 PR PBB SHADOW E&M-EST. PATIENT-LVL III: ICD-10-PCS | Mod: PBBFAC,,, | Performed by: OBSTETRICS & GYNECOLOGY

## 2020-06-03 PROCEDURE — 99999 PR PBB SHADOW E&M-EST. PATIENT-LVL III: CPT | Mod: PBBFAC,,, | Performed by: OBSTETRICS & GYNECOLOGY

## 2020-06-03 PROCEDURE — 99024 PR POST-OP FOLLOW-UP VISIT: ICD-10-PCS | Mod: S$GLB,,, | Performed by: OBSTETRICS & GYNECOLOGY

## 2020-06-09 ENCOUNTER — HOSPITAL ENCOUNTER (OUTPATIENT)
Dept: CARDIOLOGY | Facility: OTHER | Age: 30
Discharge: HOME | End: 2020-06-09
Attending: INTERNAL MEDICINE
Payer: COMMERCIAL

## 2020-06-09 VITALS
DIASTOLIC BLOOD PRESSURE: 60 MMHG | HEIGHT: 69 IN | SYSTOLIC BLOOD PRESSURE: 100 MMHG | WEIGHT: 131 LBS | BODY MASS INDEX: 19.4 KG/M2

## 2020-06-09 DIAGNOSIS — R00.2 PALPITATIONS: ICD-10-CM

## 2020-06-09 LAB
ASCENDING AORTA: 2.34 CM
AV INDEX (PROSTH): 0.93
AV MEAN GRADIENT: 3 MMHG
AV PEAK GRADIENT: 5 MMHG
AV VALVE AREA: 2.81 CM2
AV VELOCITY RATIO: 0.78
BSA FOR ECHO PROCEDURE: 1.7 M2
CV ECHO LV RWT: 0.3 CM
DOP CALC AO PEAK VEL: 1.17 M/S
DOP CALC AO VTI: 18.36 CM
DOP CALC LVOT AREA: 3 CM2
DOP CALC LVOT DIAMETER: 1.96 CM
DOP CALC LVOT PEAK VEL: 0.91 M/S
DOP CALC LVOT STROKE VOLUME: 51.6 CM3
DOP CALCLVOT PEAK VEL VTI: 17.11 CM
E WAVE DECELERATION TIME: 216.46 MSEC
E/A RATIO: 1.55
E/E' RATIO: 5.24 M/S
ECHO LV POSTERIOR WALL: 0.72 CM (ref 0.6–1.1)
FRACTIONAL SHORTENING: 42 % (ref 28–44)
INTERVENTRICULAR SEPTUM: 0.78 CM (ref 0.6–1.1)
IVRT: 96.12 MSEC
LA MAJOR: 4.05 CM
LA WIDTH: 2.95 CM
LEFT ATRIUM SIZE: 3.3 CM
LEFT ATRIUM VOLUME INDEX MOD: 21.4 ML/M2
LEFT ATRIUM VOLUME MOD: 37 CM3
LEFT INTERNAL DIMENSION IN SYSTOLE: 2.84 CM (ref 2.1–4)
LEFT VENTRICLE DIASTOLIC VOLUME INDEX: 64.01 ML/M2
LEFT VENTRICLE DIASTOLIC VOLUME: 110.46 ML
LEFT VENTRICLE MASS INDEX: 69 G/M2
LEFT VENTRICLE SYSTOLIC VOLUME INDEX: 17.7 ML/M2
LEFT VENTRICLE SYSTOLIC VOLUME: 30.52 ML
LEFT VENTRICULAR INTERNAL DIMENSION IN DIASTOLE: 4.86 CM (ref 3.5–6)
LEFT VENTRICULAR MASS: 119.13 G
LV LATERAL E/E' RATIO: 5.07 M/S
LV SEPTAL E/E' RATIO: 5.43 M/S
MV PEAK A VEL: 0.49 M/S
MV PEAK E VEL: 0.76 M/S
MV STENOSIS PRESSURE HALF TIME: 63 MS
MV VALVE AREA P 1/2 METHOD: 3.49 CM2
PISA TR MAX VEL: 2.29 M/S
PULM VEIN S/D RATIO: 0.69
PV PEAK D VEL: 0.59 M/S
PV PEAK S VEL: 0.41 M/S
PV PEAK VELOCITY: 1.11 CM/S
RA MAJOR: 3.56 CM
RA WIDTH: 3.73 CM
RIGHT VENTRICULAR END-DIASTOLIC DIMENSION: 3.14 CM
SINUS: 2.83 CM
TDI LATERAL: 0.15 M/S
TDI SEPTAL: 0.14 M/S
TDI: 0.15 M/S
TR MAX PG: 21 MMHG
TRICUSPID ANNULAR PLANE SYSTOLIC EXCURSION: 2.24 CM

## 2020-06-09 PROCEDURE — 93306 ECHO (CUPID ONLY): ICD-10-PCS | Mod: 26,,, | Performed by: INTERNAL MEDICINE

## 2020-06-09 PROCEDURE — 93306 TTE W/DOPPLER COMPLETE: CPT | Mod: 26,,, | Performed by: INTERNAL MEDICINE

## 2020-06-09 PROCEDURE — 93306 TTE W/DOPPLER COMPLETE: CPT

## 2020-06-15 ENCOUNTER — PATIENT MESSAGE (OUTPATIENT)
Dept: OBSTETRICS AND GYNECOLOGY | Facility: CLINIC | Age: 30
End: 2020-06-15

## 2020-06-22 ENCOUNTER — OFFICE VISIT (OUTPATIENT)
Dept: CARDIOLOGY | Facility: CLINIC | Age: 30
End: 2020-06-22
Payer: COMMERCIAL

## 2020-06-22 ENCOUNTER — TELEPHONE (OUTPATIENT)
Dept: OBSTETRICS AND GYNECOLOGY | Facility: CLINIC | Age: 30
End: 2020-06-22

## 2020-06-22 ENCOUNTER — OFFICE VISIT (OUTPATIENT)
Dept: OBSTETRICS AND GYNECOLOGY | Facility: CLINIC | Age: 30
End: 2020-06-22
Payer: COMMERCIAL

## 2020-06-22 VITALS
HEIGHT: 69 IN | WEIGHT: 133.63 LBS | HEART RATE: 80 BPM | SYSTOLIC BLOOD PRESSURE: 102 MMHG | BODY MASS INDEX: 19.79 KG/M2 | DIASTOLIC BLOOD PRESSURE: 70 MMHG

## 2020-06-22 VITALS
HEIGHT: 69 IN | SYSTOLIC BLOOD PRESSURE: 102 MMHG | DIASTOLIC BLOOD PRESSURE: 64 MMHG | BODY MASS INDEX: 19.59 KG/M2 | WEIGHT: 132.25 LBS

## 2020-06-22 DIAGNOSIS — N32.89 BLADDER SPASMS: ICD-10-CM

## 2020-06-22 DIAGNOSIS — R30.0 DYSURIA: Primary | ICD-10-CM

## 2020-06-22 DIAGNOSIS — R39.89 SUSPECTED UTI: Primary | ICD-10-CM

## 2020-06-22 DIAGNOSIS — R00.2 PALPITATIONS: Primary | ICD-10-CM

## 2020-06-22 LAB
B-HCG UR QL: NEGATIVE
BILIRUB SERPL-MCNC: NORMAL MG/DL
BLOOD URINE, POC: NORMAL
CLARITY, POC UA: NORMAL
COLOR, POC UA: YELLOW
CTP QC/QA: YES
GLUCOSE UR QL STRIP: NORMAL
KETONES UR QL STRIP: NORMAL
LEUKOCYTE ESTERASE URINE, POC: NORMAL
NITRITE, POC UA: NORMAL
PH, POC UA: 5
PROTEIN, POC: NORMAL
SPECIFIC GRAVITY, POC UA: 1
UROBILINOGEN, POC UA: NORMAL

## 2020-06-22 PROCEDURE — 81002 POCT URINE DIPSTICK WITHOUT MICROSCOPE: ICD-10-PCS | Mod: S$GLB,,, | Performed by: NURSE PRACTITIONER

## 2020-06-22 PROCEDURE — 99999 PR PBB SHADOW E&M-EST. PATIENT-LVL III: ICD-10-PCS | Mod: PBBFAC,,, | Performed by: INTERNAL MEDICINE

## 2020-06-22 PROCEDURE — 99214 OFFICE O/P EST MOD 30 MIN: CPT | Mod: 25,S$GLB,, | Performed by: NURSE PRACTITIONER

## 2020-06-22 PROCEDURE — 99214 OFFICE O/P EST MOD 30 MIN: CPT | Mod: S$GLB,,, | Performed by: INTERNAL MEDICINE

## 2020-06-22 PROCEDURE — 87086 URINE CULTURE/COLONY COUNT: CPT

## 2020-06-22 PROCEDURE — 81002 URINALYSIS NONAUTO W/O SCOPE: CPT | Mod: S$GLB,,, | Performed by: NURSE PRACTITIONER

## 2020-06-22 PROCEDURE — 3008F PR BODY MASS INDEX (BMI) DOCUMENTED: ICD-10-PCS | Mod: CPTII,S$GLB,, | Performed by: NURSE PRACTITIONER

## 2020-06-22 PROCEDURE — 3008F PR BODY MASS INDEX (BMI) DOCUMENTED: ICD-10-PCS | Mod: CPTII,S$GLB,, | Performed by: INTERNAL MEDICINE

## 2020-06-22 PROCEDURE — 3008F BODY MASS INDEX DOCD: CPT | Mod: CPTII,S$GLB,, | Performed by: NURSE PRACTITIONER

## 2020-06-22 PROCEDURE — 99999 PR PBB SHADOW E&M-EST. PATIENT-LVL III: ICD-10-PCS | Mod: PBBFAC,,, | Performed by: NURSE PRACTITIONER

## 2020-06-22 PROCEDURE — 99999 PR PBB SHADOW E&M-EST. PATIENT-LVL III: CPT | Mod: PBBFAC,,, | Performed by: INTERNAL MEDICINE

## 2020-06-22 PROCEDURE — 99999 PR PBB SHADOW E&M-EST. PATIENT-LVL III: CPT | Mod: PBBFAC,,, | Performed by: NURSE PRACTITIONER

## 2020-06-22 PROCEDURE — 99214 PR OFFICE/OUTPT VISIT, EST, LEVL IV, 30-39 MIN: ICD-10-PCS | Mod: 25,S$GLB,, | Performed by: NURSE PRACTITIONER

## 2020-06-22 PROCEDURE — 3008F BODY MASS INDEX DOCD: CPT | Mod: CPTII,S$GLB,, | Performed by: INTERNAL MEDICINE

## 2020-06-22 PROCEDURE — 99214 PR OFFICE/OUTPT VISIT, EST, LEVL IV, 30-39 MIN: ICD-10-PCS | Mod: S$GLB,,, | Performed by: INTERNAL MEDICINE

## 2020-06-22 RX ORDER — NITROFURANTOIN 25; 75 MG/1; MG/1
100 CAPSULE ORAL 2 TIMES DAILY
Qty: 10 CAPSULE | Refills: 0 | Status: SHIPPED | OUTPATIENT
Start: 2020-06-22 | End: 2020-06-27

## 2020-06-22 NOTE — TELEPHONE ENCOUNTER
Pt states she feels like she has a UTI.  She had these symptoms prior to surgery but urine culture was negative.  C/o bladder spasms and vaginal pain.  Denies dysuria.  Recommended an appt.  She has an appt today at [facility], offered OBGYN urgent care.  Scheduled.

## 2020-06-22 NOTE — PROGRESS NOTES
CC: Dysuria    HPI: Pt is a 30 y.o.  female who presents for evaluation of her UTI symptoms.   The patient presents today with dysuria, frequency, and urgency for the past 3 days. The patient denies flank pain, fever, nausea, vomiting, and hematuria. She denies frequent or recurrent UTIs.   Alleviating factors: None    ROS:  GENERAL: Feeling well overall. Denies fever or chills.   SKIN: Denies rash or lesions.   HEAD: Denies head injury or headache.   NODES: Denies enlarged lymph nodes.   CHEST: Denies chest pain or shortness of breath.   CARDIOVASCULAR: Denies palpitations or left sided chest pain.   ABDOMEN: No abdominal pain, constipation, diarrhea, nausea, vomiting or rectal bleeding.   URINARY: + dysuria or burning on urination.  REPRODUCTIVE: See HPI.   BREASTS: Denies pain, lumps, or nipple discharge.   HEMATOLOGIC: No easy bruisability or excessive bleeding.   MUSCULOSKELETAL: Denies joint pain or swelling.   NEUROLOGIC: Denies syncope or weakness.   PSYCHIATRIC: Denies depression, anxiety or mood swings.    PE:   APPEARANCE: Well nourished, well developed, White female in no acute distress.  PELVIS: Deferred  ABDOMEN: Mild suprapubic tenderness  MUSCULOSKELETAL: No CVA tenderness      Diagnosis:  1. Suspected UTI        Plan:     Orders Placed This Encounter    Urine culture    POCT Urine Pregnancy    POCT URINE DIPSTICK WITHOUT MICROSCOPE    nitrofurantoin, macrocrystal-monohydrate, (MACROBID) 100 MG capsule     Urine dip: WBC, RBC, nitrite+  Urine culture  Macrobid    -UTI prevention including   a. perineal hygiene, wipe front to back,  b. drink water prior to intercourse and urinate afterwards and avoid certain positions which could increase likelyhood of UTIs,  c. establish regular bladder habits,   d. avoid vulvovaginal irritants,   e. increase fluids and avoid caffeine and alcohol;  -signs of pylenephritis and to go to the ED if develops fever, chills, n/v, back pain, worsening dyuria,  hematuria;   -pelvic rest until symptoms resolve;  -Macrobid use and potential side effects;  -A.C.S. Pap and pelvic exam guidelines (pap every 3 years), recomendations for yearly mammogram;  -to follow up with her PCP for other health maintenance.       Follow-up PRN no resolution of symptoms.      Tiny Lang, FNP-C

## 2020-06-22 NOTE — PROGRESS NOTES
Cardiology    6/22/2020  10:37 AM    Problem list  Patient Active Problem List   Diagnosis    Palpitations    Pelvic pain in female    Endometriosis determined by laparoscopy       CC:  preop    HPI:  Seen for preop clearance for laparascopic GYN surgery for endometriosis.  Had surgery uneventfully.  Echo showed normal LV function and no valvular disease.  States she feels her heart races when she stands quickly.  Her apple watch read HR of 150.  No Cp, SOB, dizziness.  Saw Dr. Hansel Horvath at [facility] 3 years ago and stated that she passed out on tilt table test after getting SL NTG.      Medications  Current Outpatient Medications   Medication Sig Dispense Refill    cetirizine (ZYRTEC) 10 MG tablet Take 10 mg by mouth once daily.      fluticasone propionate (FLONASE) 50 mcg/actuation nasal spray fluticasone propionate 50 mcg/actuation nasal spray,suspension      ibuprofen (ADVIL,MOTRIN) 800 MG tablet Take 1 tablet (800 mg total) by mouth every 8 (eight) hours as needed for Pain. 30 tablet 0    loratadine (CLARITIN) 10 mg tablet Take 10 mg by mouth once daily.      omeprazole (PRILOSEC) 10 MG capsule Take 10 mg by mouth once daily.      AFLURIA QUAD 4512-3177, PF, 60 mcg/0.5 mL vaccine ADM 0.5ML IM UTD  0    norgestimate-ethinyl estradioL (ORTHO TRI-CYCLEN,TRI-SPRINTEC) 0.18/0.215/0.25 mg-35 mcg (28) tablet Take 1 tablet by mouth once daily. (Patient not taking: Reported on 6/22/2020) 90 tablet 0    ondansetron (ZOFRAN-ODT) 4 MG TbDL Take 1 tablet (4 mg total) by mouth every 6 (six) hours as needed. (Patient not taking: Reported on 6/22/2020) 20 tablet 0    oxyCODONE-acetaminophen (PERCOCET) 5-325 mg per tablet Take 1 tablet by mouth every 4 (four) hours as needed for Pain. (Patient not taking: Reported on 6/22/2020) 20 tablet 0    oxyCODONE-acetaminophen (PERCOCET) 5-325 mg per tablet Take 1 tablet by mouth every 4 (four) hours as needed for Pain. (Patient not taking: Reported on 6/22/2020) 14  tablet 0     No current facility-administered medications for this visit.      Facility-Administered Medications Ordered in Other Visits   Medication Dose Route Frequency Provider Last Rate Last Dose    mupirocin 2 % ointment   Nasal On Call Procedure Valarie Bennett MD          Prior to Admission medications    Medication Sig Start Date End Date Taking? Authorizing Provider   cetirizine (ZYRTEC) 10 MG tablet Take 10 mg by mouth once daily.   Yes Historical Provider, MD   fluticasone propionate (FLONASE) 50 mcg/actuation nasal spray fluticasone propionate 50 mcg/actuation nasal spray,suspension   Yes Historical Provider, MD   ibuprofen (ADVIL,MOTRIN) 800 MG tablet Take 1 tablet (800 mg total) by mouth every 8 (eight) hours as needed for Pain. 5/19/20  Yes Valarie Bennett MD   loratadine (CLARITIN) 10 mg tablet Take 10 mg by mouth once daily.   Yes Historical Provider, MD   omeprazole (PRILOSEC) 10 MG capsule Take 10 mg by mouth once daily.   Yes Historical Provider, MD   AFLURIA QUAD [id number], PF, 60 mcg/0.5 mL vaccine ADM 0.5ML IM UTD 10/10/18   Historical Provider, MD   norgestimate-ethinyl estradioL (ORTHO TRI-CYCLEN,TRI-SPRINTEC) 0.18/0.215/0.25 mg-35 mcg (28) tablet Take 1 tablet by mouth once daily.  Patient not taking: Reported on 6/22/2020 5/19/20 5/19/21  Valarie Bennett MD   ondansetron (ZOFRAN-ODT) 4 MG TbDL Take 1 tablet (4 mg total) by mouth every 6 (six) hours as needed.  Patient not taking: Reported on 6/22/2020 5/20/20   Valarie Bennett MD   oxyCODONE-acetaminophen (PERCOCET) 5-325 mg per tablet Take 1 tablet by mouth every 4 (four) hours as needed for Pain.  Patient not taking: Reported on 6/22/2020 4/24/20   Valarie Bennett MD   oxyCODONE-acetaminophen (PERCOCET) 5-325 mg per tablet Take 1 tablet by mouth every 4 (four) hours as needed for Pain.  Patient not taking: Reported on 6/22/2020 5/19/20   Valarie Bennett MD         History  Past Medical  History:   Diagnosis Date    Abnormal Pap smear of cervix 2014    Hpv +        (Ascus/ Hpv neg on 8-10-16)    History of HPV infection 2016    Pap smear for cervical cancer screening 08/10/2016    ASCUS/ Hpv Negative  (Previous Hpv + Paps, Repeat paps Q 6 months) (DUE REPEAT PAP FEB 2017)    Tachycardia      Past Surgical History:   Procedure Laterality Date    CHROMOTUBATION OF FALLOPIAN TUBE Bilateral 5/19/2020    Procedure: CHROMOTUBATION, OVIDUCT;  Surgeon: Valarie Bennett MD;  Location: [facility];  Service: OB/GYN;  Laterality: Bilateral;    COLPOSCOPY  2014    DIAGNOSTIC LAPAROSCOPY N/A 5/19/2020    Procedure: LAPAROSCOPY, DIAGNOSTIC;  Surgeon: Valarie Bennett MD;  Location: [facility];  Service: OB/GYN;  Laterality: N/A;    TONSILLECTOMY  2002     Social History     Socioeconomic History    Marital status:      Spouse name: Not on file    Number of children: Not on file    Years of education: Not on file    Highest education level: Not on file   Occupational History    Not on file   Social Needs    Financial resource strain: Not on file    Food insecurity     Worry: Not on file     Inability: Not on file    Transportation needs     Medical: Not on file     Non-medical: Not on file   Tobacco Use    Smoking status: Never Smoker    Smokeless tobacco: Never Used   Substance and Sexual Activity    Alcohol use: Yes     Comment: Socially    Drug use: No    Sexual activity: Yes     Partners: Male     Birth control/protection: Condom     Comment: Single:  In a relationship   Lifestyle    Physical activity     Days per week: Not on file     Minutes per session: Not on file    Stress: Not on file   Relationships    Social connections     Talks on phone: Not on file     Gets together: Not on file     Attends Yazdanism service: Not on file     Active member of club or organization: Not on file     Attends meetings of clubs or organizations: Not on file     Relationship status: Not  on file   Other Topics Concern    Not on file   Social History Narrative    Not on file         Allergies  Review of patient's allergies indicates:   Allergen Reactions    Metoprolol Rash    Neomycin Rash         Review of Systems   Review of Systems   Constitution: Negative for decreased appetite, fever and weight loss.   HENT: Negative for congestion and nosebleeds.    Eyes: Negative for double vision, vision loss in left eye, vision loss in right eye and visual disturbance.   Cardiovascular: Negative for chest pain, claudication, cyanosis, dyspnea on exertion, irregular heartbeat, leg swelling, near-syncope, orthopnea, palpitations, paroxysmal nocturnal dyspnea and syncope.   Respiratory: Negative for cough, hemoptysis, shortness of breath, sleep disturbances due to breathing, snoring, sputum production and wheezing.    Endocrine: Negative for cold intolerance and heat intolerance.   Skin: Negative for nail changes and rash.   Musculoskeletal: Negative for joint pain, muscle cramps, muscle weakness and myalgias.   Gastrointestinal: Negative for change in bowel habit, heartburn, hematemesis, hematochezia, hemorrhoids and melena.   Neurological: Negative for dizziness, focal weakness and headaches.         Physical Exam  Wt Readings from Last 1 Encounters:   06/22/20 60.6 kg (133 lb 9.6 oz)     BP Readings from Last 3 Encounters:   06/22/20 102/70   06/09/20 100/60   06/03/20 100/60     Pulse Readings from Last 1 Encounters:   06/22/20 80     Physical Exam   Constitutional: She is oriented to person, place, and time. She appears well-developed and well-nourished.   Cardiovascular: Normal rate, regular rhythm and normal heart sounds. Exam reveals no gallop and no friction rub.   No murmur heard.  Pulses:       Carotid pulses are 2+ on the right side and 2+ on the left side.       Dorsalis pedis pulses are 2+ on the right side and 2+ on the left side.   Pulmonary/Chest: Breath sounds normal.   Musculoskeletal:          General: No edema.   Neurological: She is alert and oriented to person, place, and time.   Vitals reviewed.                Assessment  1. Palpitations  Being evaluated        Plan and Discussion  Get Holter 24 hour.  Get records from Dr. Hansel Horvath.    Follow Up  1 month    Time spent evaluating and treating patient 25 minutes with >50% of this time being face-to-face.     Jj Alejo MD, F.A.C.C, F.S.C.A.I.

## 2020-06-22 NOTE — TELEPHONE ENCOUNTER
Pt wanted to make sure she could take Macrobid when trying to conceive or pregnant.  Reassurance given.

## 2020-06-23 ENCOUNTER — NURSE TRIAGE (OUTPATIENT)
Dept: ADMINISTRATIVE | Facility: CLINIC | Age: 30
End: 2020-06-23

## 2020-06-24 ENCOUNTER — TELEPHONE (OUTPATIENT)
Dept: OBSTETRICS AND GYNECOLOGY | Facility: CLINIC | Age: 30
End: 2020-06-24

## 2020-06-24 LAB — BACTERIA UR CULT: NO GROWTH

## 2020-06-24 NOTE — TELEPHONE ENCOUNTER
"Spoke with patient she states that she was prescribed macrobid for a UTI on yesterday.  States that she has taken a totl of 3 doses.  Last dose at 8 am this morning.  Patient states that she now has a 6/10 headache that has progressively gotten worse throughout the day.  States that she feels dizzy when standing but not when sitting.  Denies having a fever at this time.  States that she has tried tylenol and ibuprofen and nothing has helped headache pain.  Advised patient to be seen within 3-4 hours.  Anywhere care information given.  Advised patient to go to ER if unable to do virtual visit.  Patient verbalized understanding.     Reason for Disposition   [1] SEVERE headache (e.g., excruciating) AND [2] not improved after 2 hours of pain medicine    Additional Information   Negative: Difficult to awaken or acting confused (e.g., disoriented, slurred speech)   Negative: [1] Weakness of the face, arm or leg on one side of the body AND [2] new onset   Negative: [1] Numbness of the face, arm or leg on one side of the body AND [2] new onset   Negative: [1] Loss of speech or garbled speech AND [2] new onset   Negative: Passed out (i.e., lost consciousness, collapsed and was not responding)   Negative: Sounds like a life-threatening emergency to the triager   Negative: Unable to walk, or can only walk with assistance (e.g., requires support)   Negative: Stiff neck (can't touch chin to chest)   Negative: Severe pain in one eye   Negative: [1] Other family members (or roommates) with headaches AND [2] possibility of carbon monoxide exposure   Negative: [1] SEVERE headache (e.g., excruciating) AND [2] "worst headache" of life   Negative: [1] SEVERE headache AND [2] sudden-onset (i.e., reaching maximum intensity within seconds)   Negative: [1] SEVERE headache AND [2] fever   Negative: Loss of vision or double vision (Exception: same as prior migraines)   Negative: [1] Fever > 100.0 F (37.8 C) AND [2] diabetes " mellitus or weak immune system (e.g., HIV positive, cancer chemo, splenectomy, chronic steroids)   Negative: Patient sounds very sick or weak to the triager    Protocols used: HEADACHE-A-AH

## 2020-06-25 NOTE — TELEPHONE ENCOUNTER
Pt states she had a reaction to Macrobid after 3 doses.  She took 2 doses about 6 hours apart then the 3rd dose the following day then started getting a HA, weakness and dizziness. She was instructed to stop Macrobid, wait 24 hours and start Levaquin.  Informed her that her urine culture was negative but SYED Franks wants her to continue taking Levaquin due to +WBC, RBC and Nitrates.  Questions and concerns addressed.

## 2020-07-06 ENCOUNTER — TELEPHONE (OUTPATIENT)
Dept: OBSTETRICS AND GYNECOLOGY | Facility: CLINIC | Age: 30
End: 2020-07-06

## 2020-07-06 ENCOUNTER — LAB VISIT (OUTPATIENT)
Dept: LAB | Facility: HOSPITAL | Age: 30
End: 2020-07-06
Attending: OBSTETRICS & GYNECOLOGY
Payer: COMMERCIAL

## 2020-07-06 DIAGNOSIS — R30.0 DYSURIA: ICD-10-CM

## 2020-07-06 DIAGNOSIS — R30.0 DYSURIA: Primary | ICD-10-CM

## 2020-07-06 PROCEDURE — 87088 URINE BACTERIA CULTURE: CPT

## 2020-07-06 PROCEDURE — 87077 CULTURE AEROBIC IDENTIFY: CPT

## 2020-07-06 PROCEDURE — 87086 URINE CULTURE/COLONY COUNT: CPT

## 2020-07-06 PROCEDURE — 87186 SC STD MICRODIL/AGAR DIL: CPT

## 2020-07-06 RX ORDER — PHENAZOPYRIDINE HYDROCHLORIDE 200 MG/1
200 TABLET, FILM COATED ORAL 3 TIMES DAILY PRN
Qty: 20 TABLET | Refills: 0 | Status: SHIPPED | OUTPATIENT
Start: 2020-07-06 | End: 2021-07-06

## 2020-07-06 NOTE — TELEPHONE ENCOUNTER
Dr. Bennett, she would like Pyridium as long as she can take it if she is pregnant/trying.  She has ovulated but its still too early to test.  Reassurance given.

## 2020-07-06 NOTE — TELEPHONE ENCOUNTER
Pt had symptoms of what she thought was a UTI or vaginal infection.  She saw Tiny Lang in the [facility]BG[facility] urgent care, no vaginal exam was done, UA was abnormal but urine culture was negative.  She was started on Macrobid, had a reaction then was prescribed Levaquin.  She was told her urine culture was negative but to continue taking Levaquin.  She never started Levaquin because she felt better.     For the past couple days she has been having UTI symptoms.  Recommended an appt, she wants to do annual and problem visit at once.  Advised next annual availability is not until August and she cannot do a problem with annual at same visit but offered appt tomorrow for problem.  Declined, requesting to drop off urine sample.  Scheduled today for urine culture.  Annual scheduled at next available.     Do you want to treat for a UTI or wait until culture results?

## 2020-07-08 LAB — BACTERIA UR CULT: ABNORMAL

## 2020-07-09 ENCOUNTER — TELEPHONE (OUTPATIENT)
Dept: OBSTETRICS AND GYNECOLOGY | Facility: CLINIC | Age: 30
End: 2020-07-09

## 2020-07-09 RX ORDER — AMPICILLIN 500 MG/1
500 CAPSULE ORAL EVERY 6 HOURS
Qty: 20 CAPSULE | Refills: 0 | Status: SHIPPED | OUTPATIENT
Start: 2020-07-09 | End: 2020-07-14

## 2020-07-09 NOTE — TELEPHONE ENCOUNTER
This patient sees Dr. Bennett and has a urine culture that is positive for ENTEROCOCCUS FAECALIS. She is allergic to Macrobid. Pt requesting that an antibiotic be sent in today because she is going out of town in the morning. Please advise.

## 2020-07-16 ENCOUNTER — LAB VISIT (OUTPATIENT)
Dept: LAB | Facility: HOSPITAL | Age: 30
End: 2020-07-16
Attending: OBSTETRICS & GYNECOLOGY
Payer: COMMERCIAL

## 2020-07-16 ENCOUNTER — TELEPHONE (OUTPATIENT)
Dept: OBSTETRICS AND GYNECOLOGY | Facility: CLINIC | Age: 30
End: 2020-07-16

## 2020-07-16 DIAGNOSIS — R30.0 DYSURIA: Primary | ICD-10-CM

## 2020-07-16 DIAGNOSIS — R30.0 DYSURIA: ICD-10-CM

## 2020-07-16 PROCEDURE — 81001 URINALYSIS AUTO W/SCOPE: CPT

## 2020-07-16 PROCEDURE — 87086 URINE CULTURE/COLONY COUNT: CPT

## 2020-07-16 NOTE — TELEPHONE ENCOUNTER
She saw Tiny on 6/22 for UTI symptoms/vaginal pain.  UA positive but culture  Negative.  She was prescribed Macrobid, had an allergic reaction.  Stopped macrobid, still had symptoms for 2 weeks so she dropped off a urine culture 7/6 and it was positive.  Completed amp rx.     Pt finished ampicillin Tuesday and started feeling symptoms again today.  She is driving back from [place], should be back in town this evening.  Scheduled her to drop off UA and culture today.

## 2020-07-17 LAB
BACTERIA #/AREA URNS AUTO: ABNORMAL /HPF
BACTERIA UR CULT: NO GROWTH
BILIRUB UR QL STRIP: NEGATIVE
CLARITY UR REFRACT.AUTO: ABNORMAL
COLOR UR AUTO: YELLOW
GLUCOSE UR QL STRIP: NEGATIVE
HGB UR QL STRIP: NEGATIVE
KETONES UR QL STRIP: NEGATIVE
LEUKOCYTE ESTERASE UR QL STRIP: NEGATIVE
MICROSCOPIC COMMENT: ABNORMAL
NITRITE UR QL STRIP: NEGATIVE
PH UR STRIP: 5 [PH] (ref 5–8)
PROT UR QL STRIP: NEGATIVE
RBC #/AREA URNS AUTO: 2 /HPF (ref 0–4)
SP GR UR STRIP: 1.02 (ref 1–1.03)
SQUAMOUS #/AREA URNS AUTO: 4 /HPF
URN SPEC COLLECT METH UR: ABNORMAL
WBC #/AREA URNS AUTO: 2 /HPF (ref 0–5)

## 2020-08-03 ENCOUNTER — TELEPHONE (OUTPATIENT)
Dept: OBSTETRICS AND GYNECOLOGY | Facility: CLINIC | Age: 30
End: 2020-08-03

## 2020-08-03 NOTE — TELEPHONE ENCOUNTER
Pt reports right sided pelvic pain worse with BM.  This morning BM took about an hour.  States she was sweating, nauseated and felt like she was going to pass out from the pain.  She thinks she ovulated from the right ovary this month.  Last time left ovary was inflamed.  Has endometriosis.       She had an US 4/29/2020.    Scheduled appt with Dr. Feliciano tomorrow.

## 2020-08-04 ENCOUNTER — OFFICE VISIT (OUTPATIENT)
Dept: OBSTETRICS AND GYNECOLOGY | Facility: CLINIC | Age: 30
End: 2020-08-04
Payer: COMMERCIAL

## 2020-08-04 VITALS
HEIGHT: 69 IN | SYSTOLIC BLOOD PRESSURE: 98 MMHG | BODY MASS INDEX: 19.92 KG/M2 | DIASTOLIC BLOOD PRESSURE: 60 MMHG | WEIGHT: 134.5 LBS

## 2020-08-04 DIAGNOSIS — K59.00 DYSCHEZIA: Primary | ICD-10-CM

## 2020-08-04 PROCEDURE — 99999 PR PBB SHADOW E&M-EST. PATIENT-LVL III: ICD-10-PCS | Mod: PBBFAC,,, | Performed by: OBSTETRICS & GYNECOLOGY

## 2020-08-04 PROCEDURE — 99213 OFFICE O/P EST LOW 20 MIN: CPT | Mod: S$GLB,,, | Performed by: OBSTETRICS & GYNECOLOGY

## 2020-08-04 PROCEDURE — 99213 PR OFFICE/OUTPT VISIT, EST, LEVL III, 20-29 MIN: ICD-10-PCS | Mod: S$GLB,,, | Performed by: OBSTETRICS & GYNECOLOGY

## 2020-08-04 PROCEDURE — 3008F PR BODY MASS INDEX (BMI) DOCUMENTED: ICD-10-PCS | Mod: CPTII,S$GLB,, | Performed by: OBSTETRICS & GYNECOLOGY

## 2020-08-04 PROCEDURE — 3008F BODY MASS INDEX DOCD: CPT | Mod: CPTII,S$GLB,, | Performed by: OBSTETRICS & GYNECOLOGY

## 2020-08-04 PROCEDURE — 99999 PR PBB SHADOW E&M-EST. PATIENT-LVL III: CPT | Mod: PBBFAC,,, | Performed by: OBSTETRICS & GYNECOLOGY

## 2020-08-04 RX ORDER — CETIRIZINE HYDROCHLORIDE 10 MG/1
1 TABLET ORAL
COMMUNITY
Start: 2020-06-24 | End: 2020-08-04

## 2020-08-04 NOTE — PROGRESS NOTES
"    Chief Complaint: Dyschezia     HPI:      Ainsley Garduno is a 30 y.o. G0 who presents complaining of an episode of dyschezia. Pt has had episodes like this in the past. Has been evaluated by GI and it is likely a combination of constipation and endometriosis. Underwent Dx Lap with fulguration of endometrial implants in 5/2020. Since that time pain has been improved but still present. Pt believes she gets a few hours of ovulation pain each month, most recently (2 weeks ago) on the right. She has intermittent pain with intercourse, last intercourse 1 week ago was painful. She was taking a stool softener daily but had stopped. She had a BM that took an hour and was extremely painful. Pt was crying and sweating. Once her BM was over she felt tired but no longer in pain.  Ms. Garduno is currently sexually active with a single male partner. She has been trying to conceive the past 5 months. Patient has regular monthly menses. Patient's last menstrual period was 07/14/2020.      ROS:     GENERAL: Denies fevers or chills. Feeling well overall.   ABDOMEN: Denies abdominal pain, constipation, diarrhea, nausea, vomiting, change in appetite.   URINARY: Denies frequency, dysuria, hematuria.  NEUROLOGIC: Denies syncope or weakness.     Physical Exam:      PHYSICAL EXAM:  BP 98/60   Ht 5' 9" (1.753 m)   Wt 61 kg (134 lb 7.7 oz)   LMP 07/14/2020   BMI 19.86 kg/m²   Body mass index is 19.86 kg/m².     APPEARANCE: Well nourished, well developed, in no acute distress.  ABDOMEN: Soft.  No tenderness or masses.    PELVIC: Normal external genitalia without lesions.  Normal hair distribution.  Adequate perineal body, normal urethral meatus.  Vagina moist and well rugated without lesions or discharge.  Cervix pink, without lesions, discharge or tenderness.  No significant cystocele or rectocele.  Bimanual exam shows uterus to be normal size, regular, mobile and nontender.  Adnexa without masses or tenderness.    EXTREMITIES: No " edema.     Assessment/Plan:     Dyschezia  - Recommended increased hydration and stool softeners.   - Linzess safe in pregnancy if patient would like to try it given her constipation issues.     If pt does not achieve pregnancy by next month will send  for semen analysis. Chromopertubation showed good flow of dye through bilateral fallopian tubes. Given endometriosis want to decrease amount of time not on suppression so may need [facility] referral sooner rather than later.     Counseling:       Use of the "[employer] Patient Portal discussed and encouraged during today's visit.

## 2020-08-10 ENCOUNTER — TELEPHONE (OUTPATIENT)
Dept: OBSTETRICS AND GYNECOLOGY | Facility: CLINIC | Age: 30
End: 2020-08-10

## 2020-08-10 RX ORDER — LEVONORGESTREL AND ETHINYL ESTRADIOL 0.1-0.02MG
1 KIT ORAL DAILY
Qty: 28 TABLET | Refills: 6 | Status: SHIPPED | OUTPATIENT
Start: 2020-08-10 | End: 2021-08-10

## 2020-10-19 ENCOUNTER — OFFICE VISIT (OUTPATIENT)
Dept: OBSTETRICS AND GYNECOLOGY | Facility: CLINIC | Age: 30
End: 2020-10-19
Payer: COMMERCIAL

## 2020-10-19 VITALS
DIASTOLIC BLOOD PRESSURE: 74 MMHG | BODY MASS INDEX: 19.86 KG/M2 | SYSTOLIC BLOOD PRESSURE: 114 MMHG | WEIGHT: 134.06 LBS | HEIGHT: 69 IN

## 2020-10-19 DIAGNOSIS — Z01.419 ENCOUNTER FOR ANNUAL ROUTINE GYNECOLOGICAL EXAMINATION: Primary | ICD-10-CM

## 2020-10-19 DIAGNOSIS — N80.9 ENDOMETRIOSIS DETERMINED BY LAPAROSCOPY: ICD-10-CM

## 2020-10-19 DIAGNOSIS — N94.10 DYSPAREUNIA, FEMALE: ICD-10-CM

## 2020-10-19 PROCEDURE — 99395 PREV VISIT EST AGE 18-39: CPT | Mod: S$GLB,,, | Performed by: OBSTETRICS & GYNECOLOGY

## 2020-10-19 PROCEDURE — 99999 PR PBB SHADOW E&M-EST. PATIENT-LVL III: CPT | Mod: PBBFAC,,, | Performed by: OBSTETRICS & GYNECOLOGY

## 2020-10-19 PROCEDURE — 99999 PR PBB SHADOW E&M-EST. PATIENT-LVL III: ICD-10-PCS | Mod: PBBFAC,,, | Performed by: OBSTETRICS & GYNECOLOGY

## 2020-10-19 PROCEDURE — 99395 PR PREVENTIVE VISIT,EST,18-39: ICD-10-PCS | Mod: S$GLB,,, | Performed by: OBSTETRICS & GYNECOLOGY

## 2020-10-19 RX ORDER — PRENATAL WITH FERROUS FUM AND FOLIC ACID 3080; 920; 120; 400; 22; 1.84; 3; 20; 10; 1; 12; 200; 27; 25; 2 [IU]/1; [IU]/1; MG/1; [IU]/1; MG/1; MG/1; MG/1; MG/1; MG/1; MG/1; UG/1; MG/1; MG/1; MG/1; MG/1
TABLET ORAL
COMMUNITY
Start: 2020-10-05

## 2020-10-19 RX ORDER — FOLIC ACID/MULTIVIT,IRON,MINER 0.4MG-18MG
TABLET ORAL
COMMUNITY
Start: 2020-10-05

## 2020-10-19 NOTE — PROGRESS NOTES
"Chief Complaint: Well Woman Exam     HPI:      Ainsley Garduno is a 30 y.o.  who presents today for well woman exam.  LMP: Patient's last menstrual period was 10/05/2020.  No issues, problems, or complaints. Still has pain with intercourse and is taking a break from trying to conceive secondary to life stressors. Started her OCPs again 2 weeks ago.  Specifically, patient denies abnormal vaginal bleeding, discharge, urinary problems, or changes in appetite. Ms. Garduno is currently sexually active with a single male partner. She is currently using OCPs for contraception. She declines STD screening today.    Previous Pap:  NILM (8/15/2018)    Gardasil:has never had     Ms. Garduno confirms that she wears her seatbelt when riding in the car and does not text while driving.     OB History        0    Para   0    Term   0       0    AB   0    Living   0       SAB   0    TAB   0    Ectopic   0    Multiple   0    Live Births               Obstetric Comments   Menarche- 15             ROS:     GENERAL: Denies unintentional weight gain or weight loss. Feeling well overall.   SKIN: Denies rash or lesions.   HEENT: Denies headaches, or vision changes.   CARDIOVASCULAR: Denies palpitations or chest pain.   RESPIRATORY: Denies shortness of breath or dyspnea on exertion.  BREASTS: Denies pain, lumps, or nipple discharge.   ABDOMEN: Denies abdominal pain, constipation, diarrhea, nausea, vomiting, change in appetite.  URINARY: Denies frequency, dysuria, hematuria.  NEUROLOGIC: Denies syncope or weakness.   PSYCHIATRIC: Denies depression, anxiety or mood swings.    Physical Exam:      PHYSICAL EXAM:  /74   Ht 5' 9" (1.753 m)   Wt 60.8 kg (134 lb 0.6 oz)   LMP 10/05/2020   BMI 19.79 kg/m²   Body mass index is 19.79 kg/m².     APPEARANCE: Well nourished, well developed, in no acute distress.  PSYCH: Appropriate mood and affect.  SKIN: No acne or hirsutism  NECK: Neck symmetric without masses or " thyromegaly  NODES: No inguinal, axillary, or supraclavicular lymph node enlargement  ABDOMEN: Soft.  No tenderness or masses.    CARDIOVASCULAR: No edema of peripheral extremities  BREASTS: Symmetrical, no skin changes or visible lesions.  No palpable masses or nipple discharge bilaterally.  PELVIC: Normal external genitalia without lesions.  Normal hair distribution.  Adequate perineal body, normal urethral meatus.  Vagina moist and well rugated without lesions or discharge.  Cervix pink, without lesions, discharge or tenderness.  No significant cystocele or rectocele.  Bimanual exam shows uterus to be normal size, regular, mobile and nontender.  Adnexa without masses or tenderness.      Assessment/Plan:     Encounter for annual routine gynecological examination    Dyspareunia, female  -Orilissa and Nhung discussed with patient today in case she decides to continue to delay conception.    Endometriosis determined by laparoscopy  - Sending  for semen analysis  - Pt has the most pain with intercourse during ovulation so may need IUI    Follow up in about 1 year (around 10/19/2021) for Annual Exam.    Counseling:     Patient was counseled today on current ASCCP pap guidelines, the recommendation for yearly pelvic exams, healthy diet and exercise routines, breast self awareness.She is to see her PCP for other health maintenance.     Use of the [employer] Patient Portal discussed and encouraged during today's visit.

## 2020-11-09 ENCOUNTER — PATIENT MESSAGE (OUTPATIENT)
Dept: OBSTETRICS AND GYNECOLOGY | Facility: CLINIC | Age: 30
End: 2020-11-09

## 2020-11-17 ENCOUNTER — PATIENT MESSAGE (OUTPATIENT)
Dept: OBSTETRICS AND GYNECOLOGY | Facility: CLINIC | Age: 30
End: 2020-11-17

## 2021-01-02 ENCOUNTER — PATIENT MESSAGE (OUTPATIENT)
Dept: URGENT CARE | Facility: CLINIC | Age: 31
End: 2021-01-02

## 2021-01-02 ENCOUNTER — OFFICE VISIT (OUTPATIENT)
Dept: URGENT CARE | Facility: CLINIC | Age: 31
End: 2021-01-02
Payer: COMMERCIAL

## 2021-01-02 VITALS
HEART RATE: 102 BPM | TEMPERATURE: 99 F | RESPIRATION RATE: 19 BRPM | SYSTOLIC BLOOD PRESSURE: 107 MMHG | HEIGHT: 69 IN | BODY MASS INDEX: 19.99 KG/M2 | WEIGHT: 135 LBS | DIASTOLIC BLOOD PRESSURE: 70 MMHG | OXYGEN SATURATION: 98 %

## 2021-01-02 DIAGNOSIS — J02.9 SORE THROAT: Primary | ICD-10-CM

## 2021-01-02 LAB
CTP QC/QA: YES
CTP QC/QA: YES
MOLECULAR STREP A: NEGATIVE
SARS-COV-2 RDRP RESP QL NAA+PROBE: NEGATIVE

## 2021-01-02 PROCEDURE — U0002 COVID-19 LAB TEST NON-CDC: HCPCS | Mod: QW,S$GLB,, | Performed by: FAMILY MEDICINE

## 2021-01-02 PROCEDURE — 3008F PR BODY MASS INDEX (BMI) DOCUMENTED: ICD-10-PCS | Mod: CPTII,S$GLB,, | Performed by: FAMILY MEDICINE

## 2021-01-02 PROCEDURE — 99213 OFFICE O/P EST LOW 20 MIN: CPT | Mod: S$GLB,CS,, | Performed by: FAMILY MEDICINE

## 2021-01-02 PROCEDURE — 99213 PR OFFICE/OUTPT VISIT, EST, LEVL III, 20-29 MIN: ICD-10-PCS | Mod: S$GLB,CS,, | Performed by: FAMILY MEDICINE

## 2021-01-02 PROCEDURE — U0002: ICD-10-PCS | Mod: QW,S$GLB,, | Performed by: FAMILY MEDICINE

## 2021-01-02 PROCEDURE — 87651 STREP A DNA AMP PROBE: CPT | Mod: QW,S$GLB,, | Performed by: FAMILY MEDICINE

## 2021-01-02 PROCEDURE — 3008F BODY MASS INDEX DOCD: CPT | Mod: CPTII,S$GLB,, | Performed by: FAMILY MEDICINE

## 2021-01-02 PROCEDURE — 87651 POCT STREP A MOLECULAR: ICD-10-PCS | Mod: QW,S$GLB,, | Performed by: FAMILY MEDICINE

## 2021-01-02 RX ORDER — LIDOCAINE HYDROCHLORIDE 20 MG/ML
SOLUTION OROPHARYNGEAL
Qty: 100 ML | Refills: 0 | Status: SHIPPED | OUTPATIENT
Start: 2021-01-02

## 2021-01-02 RX ORDER — AMOXICILLIN AND CLAVULANATE POTASSIUM 875; 125 MG/1; MG/1
1 TABLET, FILM COATED ORAL EVERY 12 HOURS
Qty: 20 TABLET | Refills: 0 | Status: SHIPPED | OUTPATIENT
Start: 2021-01-02 | End: 2021-10-29

## 2021-01-02 RX ORDER — FLUTICASONE PROPIONATE 50 MCG
SPRAY, SUSPENSION (ML) NASAL
Qty: 16 G | Refills: 0 | Status: SHIPPED | OUTPATIENT
Start: 2021-01-02

## 2021-03-03 ENCOUNTER — PATIENT MESSAGE (OUTPATIENT)
Dept: OBSTETRICS AND GYNECOLOGY | Facility: CLINIC | Age: 31
End: 2021-03-03

## 2021-03-15 ENCOUNTER — TELEPHONE (OUTPATIENT)
Dept: OBSTETRICS AND GYNECOLOGY | Facility: CLINIC | Age: 31
End: 2021-03-15
Payer: COMMERCIAL

## 2021-03-15 NOTE — TELEPHONE ENCOUNTER
"Pt has been going through fertility tx at [facility] and they've been "pumping" her up with hormones.  Since starting the tx, pt feels like her endometriosis pain has worsened.  Some days are worse then others.  She would have severe pain with BM and intercourse.  Reports bladder spasms.  States she would be in pain for 10 days around the time of her period.  C/o being more nauseous than before.  Lap Sx was in May 2020.    Last WWE 10/19/2020    Scheduled pt with Dr. Bennett's next available on 3/23 at 1430.  Advised that if her pain become unbearable before appt, she will need to go to ER.  "

## 2021-03-23 ENCOUNTER — OFFICE VISIT (OUTPATIENT)
Dept: OBSTETRICS AND GYNECOLOGY | Facility: CLINIC | Age: 31
End: 2021-03-23
Attending: OBSTETRICS & GYNECOLOGY
Payer: COMMERCIAL

## 2021-03-23 VITALS
BODY MASS INDEX: 20.17 KG/M2 | WEIGHT: 136.19 LBS | SYSTOLIC BLOOD PRESSURE: 110 MMHG | DIASTOLIC BLOOD PRESSURE: 66 MMHG | HEIGHT: 69 IN

## 2021-03-23 DIAGNOSIS — R10.2 PELVIC PAIN IN FEMALE: Primary | ICD-10-CM

## 2021-03-23 DIAGNOSIS — N80.9 ENDOMETRIOSIS DETERMINED BY LAPAROSCOPY: ICD-10-CM

## 2021-03-23 DIAGNOSIS — N97.9 PRIMARY FEMALE INFERTILITY: ICD-10-CM

## 2021-03-23 PROCEDURE — 99999 PR PBB SHADOW E&M-EST. PATIENT-LVL III: CPT | Mod: PBBFAC,,, | Performed by: OBSTETRICS & GYNECOLOGY

## 2021-03-23 PROCEDURE — 3008F BODY MASS INDEX DOCD: CPT | Mod: CPTII,S$GLB,, | Performed by: OBSTETRICS & GYNECOLOGY

## 2021-03-23 PROCEDURE — 99999 PR PBB SHADOW E&M-EST. PATIENT-LVL III: ICD-10-PCS | Mod: PBBFAC,,, | Performed by: OBSTETRICS & GYNECOLOGY

## 2021-03-23 PROCEDURE — 1126F AMNT PAIN NOTED NONE PRSNT: CPT | Mod: S$GLB,,, | Performed by: OBSTETRICS & GYNECOLOGY

## 2021-03-23 PROCEDURE — 99213 OFFICE O/P EST LOW 20 MIN: CPT | Mod: S$GLB,,, | Performed by: OBSTETRICS & GYNECOLOGY

## 2021-03-23 PROCEDURE — 1126F PR PAIN SEVERITY QUANTIFIED, NO PAIN PRESENT: ICD-10-PCS | Mod: S$GLB,,, | Performed by: OBSTETRICS & GYNECOLOGY

## 2021-03-23 PROCEDURE — 3008F PR BODY MASS INDEX (BMI) DOCUMENTED: ICD-10-PCS | Mod: CPTII,S$GLB,, | Performed by: OBSTETRICS & GYNECOLOGY

## 2021-03-23 PROCEDURE — 99213 PR OFFICE/OUTPT VISIT, EST, LEVL III, 20-29 MIN: ICD-10-PCS | Mod: S$GLB,,, | Performed by: OBSTETRICS & GYNECOLOGY

## 2021-03-23 RX ORDER — LETROZOLE 2.5 MG/1
TABLET, FILM COATED ORAL
COMMUNITY
Start: 2021-02-09 | End: 2021-10-29

## 2021-03-23 RX ORDER — NORETHINDRONE AND ETHINYL ESTRADIOL 0.5-0.035
KIT ORAL
COMMUNITY
Start: 2021-03-12 | End: 2021-10-29

## 2021-03-23 RX ORDER — TRIAMCINOLONE ACETONIDE 1 MG/G
CREAM TOPICAL
COMMUNITY

## 2021-04-13 ENCOUNTER — PATIENT MESSAGE (OUTPATIENT)
Dept: RESEARCH | Facility: HOSPITAL | Age: 31
End: 2021-04-13

## 2021-07-05 ENCOUNTER — PATIENT MESSAGE (OUTPATIENT)
Dept: OBSTETRICS AND GYNECOLOGY | Facility: CLINIC | Age: 31
End: 2021-07-05

## 2021-07-05 DIAGNOSIS — R32 URINARY INCONTINENCE, UNSPECIFIED TYPE: Primary | ICD-10-CM

## 2021-07-06 ENCOUNTER — PATIENT MESSAGE (OUTPATIENT)
Dept: OBSTETRICS AND GYNECOLOGY | Facility: CLINIC | Age: 31
End: 2021-07-06

## 2021-07-06 ENCOUNTER — LAB VISIT (OUTPATIENT)
Dept: LAB | Facility: HOSPITAL | Age: 31
End: 2021-07-06
Attending: OBSTETRICS & GYNECOLOGY
Payer: COMMERCIAL

## 2021-07-06 DIAGNOSIS — R32 URINARY INCONTINENCE, UNSPECIFIED TYPE: ICD-10-CM

## 2021-07-06 PROCEDURE — 87086 URINE CULTURE/COLONY COUNT: CPT | Performed by: OBSTETRICS & GYNECOLOGY

## 2021-07-07 LAB — BACTERIA UR CULT: NO GROWTH

## 2021-07-09 DIAGNOSIS — R39.89 BLADDER PAIN: Primary | ICD-10-CM

## 2021-07-09 DIAGNOSIS — R30.0 DYSURIA: ICD-10-CM

## 2021-07-09 DIAGNOSIS — N80.9 ENDOMETRIOSIS: ICD-10-CM

## 2021-09-23 ENCOUNTER — LAB VISIT (OUTPATIENT)
Dept: LAB | Facility: HOSPITAL | Age: 31
End: 2021-09-23
Attending: OBSTETRICS & GYNECOLOGY
Payer: COMMERCIAL

## 2021-09-23 ENCOUNTER — PATIENT MESSAGE (OUTPATIENT)
Dept: OBSTETRICS AND GYNECOLOGY | Facility: CLINIC | Age: 31
End: 2021-09-23

## 2021-09-23 DIAGNOSIS — R30.0 DYSURIA: Primary | ICD-10-CM

## 2021-09-23 DIAGNOSIS — R30.0 DYSURIA: ICD-10-CM

## 2021-09-23 PROCEDURE — 87086 URINE CULTURE/COLONY COUNT: CPT | Performed by: OBSTETRICS & GYNECOLOGY

## 2021-09-23 RX ORDER — DOXYCYCLINE HYCLATE 100 MG
100 TABLET ORAL 2 TIMES DAILY
Qty: 14 TABLET | Refills: 0 | Status: SHIPPED | OUTPATIENT
Start: 2021-09-23

## 2021-09-23 RX ORDER — SULFAMETHOXAZOLE AND TRIMETHOPRIM 800; 160 MG/1; MG/1
1 TABLET ORAL 2 TIMES DAILY
Qty: 10 TABLET | Refills: 0 | Status: CANCELLED | OUTPATIENT
Start: 2021-09-23 | End: 2021-09-28

## 2021-09-25 LAB — BACTERIA UR CULT: NO GROWTH

## 2021-10-06 ENCOUNTER — PATIENT MESSAGE (OUTPATIENT)
Dept: OBSTETRICS AND GYNECOLOGY | Facility: CLINIC | Age: 31
End: 2021-10-06

## 2021-10-29 ENCOUNTER — OFFICE VISIT (OUTPATIENT)
Dept: OBSTETRICS AND GYNECOLOGY | Facility: CLINIC | Age: 31
End: 2021-10-29
Payer: COMMERCIAL

## 2021-10-29 VITALS — BODY MASS INDEX: 20.24 KG/M2 | HEIGHT: 69 IN | WEIGHT: 136.69 LBS

## 2021-10-29 DIAGNOSIS — N80.9 ENDOMETRIOSIS: Primary | ICD-10-CM

## 2021-10-29 PROCEDURE — 99214 OFFICE O/P EST MOD 30 MIN: CPT | Mod: S$GLB,,, | Performed by: OBSTETRICS & GYNECOLOGY

## 2021-10-29 PROCEDURE — 99999 PR PBB SHADOW E&M-EST. PATIENT-LVL III: CPT | Mod: PBBFAC,,, | Performed by: OBSTETRICS & GYNECOLOGY

## 2021-10-29 PROCEDURE — 99999 PR PBB SHADOW E&M-EST. PATIENT-LVL III: ICD-10-PCS | Mod: PBBFAC,,, | Performed by: OBSTETRICS & GYNECOLOGY

## 2021-10-29 PROCEDURE — 99214 PR OFFICE/OUTPT VISIT, EST, LEVL IV, 30-39 MIN: ICD-10-PCS | Mod: S$GLB,,, | Performed by: OBSTETRICS & GYNECOLOGY

## 2021-10-29 RX ORDER — NORETHINDRONE ACETATE AND ETHINYL ESTRADIOL 1.5; 3 MG/1; UG/1
1 TABLET ORAL DAILY
Qty: 114 EACH | Refills: 3 | Status: SHIPPED | OUTPATIENT
Start: 2021-10-29

## 2021-10-29 RX ORDER — NORETHINDRONE ACETATE AND ETHINYL ESTRADIOL 1.5; 3 MG/1; UG/1
1 TABLET ORAL DAILY
COMMUNITY
Start: 2021-07-30 | End: 2021-10-29 | Stop reason: SDUPTHER

## 2021-11-05 ENCOUNTER — TELEPHONE (OUTPATIENT)
Dept: PHARMACY | Facility: CLINIC | Age: 31
End: 2021-11-05
Payer: COMMERCIAL

## (undated) DEVICE — KIT WING PAD POSITIONING

## (undated) DEVICE — GLOVE BIOGEL SKINSENSE PI 6.0

## (undated) DEVICE — JELLY SURGILUBE 5GR

## (undated) DEVICE — CANNULA ENDOPATH XCEL 5X100MM

## (undated) DEVICE — TROCAR ENDOPATH XCEL 5X100MM

## (undated) DEVICE — PACK LAPAROSCOPY BAPTIST

## (undated) DEVICE — ELECTRODE REM PLYHSV RETURN 9

## (undated) DEVICE — SOL CLEARIFY VISUALIZATION LAP

## (undated) DEVICE — NDL INSUF ULTRA VERESS 120MM

## (undated) DEVICE — SUT MCRYL PLUS 4-0 PS2 27IN

## (undated) DEVICE — SOL 9P NACL IRR PIC IL

## (undated) DEVICE — SOL NS 1000CC